# Patient Record
Sex: FEMALE | Race: WHITE | NOT HISPANIC OR LATINO | Employment: FULL TIME | URBAN - METROPOLITAN AREA
[De-identification: names, ages, dates, MRNs, and addresses within clinical notes are randomized per-mention and may not be internally consistent; named-entity substitution may affect disease eponyms.]

---

## 2023-12-29 ENCOUNTER — APPOINTMENT (OUTPATIENT)
Dept: CT IMAGING | Facility: HOSPITAL | Age: 23
End: 2023-12-29
Payer: COMMERCIAL

## 2023-12-29 ENCOUNTER — HOSPITAL ENCOUNTER (EMERGENCY)
Facility: HOSPITAL | Age: 23
Discharge: HOME | End: 2023-12-29
Attending: EMERGENCY MEDICINE
Payer: COMMERCIAL

## 2023-12-29 VITALS
RESPIRATION RATE: 16 BRPM | BODY MASS INDEX: 27.47 KG/M2 | SYSTOLIC BLOOD PRESSURE: 104 MMHG | HEART RATE: 73 BPM | HEIGHT: 67 IN | WEIGHT: 175 LBS | DIASTOLIC BLOOD PRESSURE: 73 MMHG | OXYGEN SATURATION: 100 % | TEMPERATURE: 97.9 F

## 2023-12-29 DIAGNOSIS — E86.0 DEHYDRATION: Primary | ICD-10-CM

## 2023-12-29 DIAGNOSIS — R10.9 ABDOMINAL PAIN, UNSPECIFIED ABDOMINAL LOCATION: ICD-10-CM

## 2023-12-29 DIAGNOSIS — R11.0 NAUSEA: ICD-10-CM

## 2023-12-29 LAB
ALBUMIN SERPL-MCNC: 4.4 G/DL (ref 3.5–5.2)
ALBUMIN/GLOB SERPL: 1.5 G/DL
ALP SERPL-CCNC: 136 U/L (ref 39–117)
ALT SERPL W P-5'-P-CCNC: 52 U/L (ref 1–33)
ANION GAP SERPL CALCULATED.3IONS-SCNC: 8 MMOL/L (ref 5–15)
AST SERPL-CCNC: 39 U/L (ref 1–32)
BASOPHILS # BLD AUTO: 0.02 10*3/MM3 (ref 0–0.2)
BASOPHILS NFR BLD AUTO: 0.3 % (ref 0–1.5)
BILIRUB SERPL-MCNC: 0.3 MG/DL (ref 0–1.2)
BUN SERPL-MCNC: 12 MG/DL (ref 6–20)
BUN/CREAT SERPL: 18.2 (ref 7–25)
CALCIUM SPEC-SCNC: 9.2 MG/DL (ref 8.6–10.5)
CHLORIDE SERPL-SCNC: 107 MMOL/L (ref 98–107)
CO2 SERPL-SCNC: 24 MMOL/L (ref 22–29)
CREAT SERPL-MCNC: 0.66 MG/DL (ref 0.57–1)
DEPRECATED RDW RBC AUTO: 41.9 FL (ref 37–54)
EGFRCR SERPLBLD CKD-EPI 2021: 125 ML/MIN/1.73
EOSINOPHIL # BLD AUTO: 0.15 10*3/MM3 (ref 0–0.4)
EOSINOPHIL NFR BLD AUTO: 2 % (ref 0.3–6.2)
ERYTHROCYTE [DISTWIDTH] IN BLOOD BY AUTOMATED COUNT: 13 % (ref 12.3–15.4)
FLUAV RNA RESP QL NAA+PROBE: NOT DETECTED
FLUBV RNA RESP QL NAA+PROBE: NOT DETECTED
GLOBULIN UR ELPH-MCNC: 2.9 GM/DL
GLUCOSE SERPL-MCNC: 105 MG/DL (ref 65–99)
HCG SERPL QL: NEGATIVE
HCT VFR BLD AUTO: 41.6 % (ref 34–46.6)
HGB BLD-MCNC: 13.4 G/DL (ref 12–15.9)
HOLD SPECIMEN: NORMAL
HOLD SPECIMEN: NORMAL
IMM GRANULOCYTES # BLD AUTO: 0 10*3/MM3 (ref 0–0.05)
IMM GRANULOCYTES NFR BLD AUTO: 0 % (ref 0–0.5)
LIPASE SERPL-CCNC: 38 U/L (ref 13–60)
LYMPHOCYTES # BLD AUTO: 2.13 10*3/MM3 (ref 0.7–3.1)
LYMPHOCYTES NFR BLD AUTO: 27.8 % (ref 19.6–45.3)
MAGNESIUM SERPL-MCNC: 2 MG/DL (ref 1.6–2.6)
MCH RBC QN AUTO: 27.8 PG (ref 26.6–33)
MCHC RBC AUTO-ENTMCNC: 32.2 G/DL (ref 31.5–35.7)
MCV RBC AUTO: 86.3 FL (ref 79–97)
MONOCYTES # BLD AUTO: 0.53 10*3/MM3 (ref 0.1–0.9)
MONOCYTES NFR BLD AUTO: 6.9 % (ref 5–12)
NEUTROPHILS NFR BLD AUTO: 4.84 10*3/MM3 (ref 1.7–7)
NEUTROPHILS NFR BLD AUTO: 63 % (ref 42.7–76)
PLATELET # BLD AUTO: 351 10*3/MM3 (ref 140–450)
PMV BLD AUTO: 10.1 FL (ref 6–12)
POTASSIUM SERPL-SCNC: 3.5 MMOL/L (ref 3.5–5.2)
PROT SERPL-MCNC: 7.3 G/DL (ref 6–8.5)
RBC # BLD AUTO: 4.82 10*6/MM3 (ref 3.77–5.28)
SARS-COV-2 RNA RESP QL NAA+PROBE: NOT DETECTED
SODIUM SERPL-SCNC: 139 MMOL/L (ref 136–145)
TSH SERPL DL<=0.05 MIU/L-ACNC: 3.08 UIU/ML (ref 0.27–4.2)
WBC NRBC COR # BLD AUTO: 7.67 10*3/MM3 (ref 3.4–10.8)
WHOLE BLOOD HOLD COAG: NORMAL
WHOLE BLOOD HOLD SPECIMEN: NORMAL

## 2023-12-29 PROCEDURE — 84703 CHORIONIC GONADOTROPIN ASSAY: CPT | Performed by: EMERGENCY MEDICINE

## 2023-12-29 PROCEDURE — 87636 SARSCOV2 & INF A&B AMP PRB: CPT | Performed by: EMERGENCY MEDICINE

## 2023-12-29 PROCEDURE — 99285 EMERGENCY DEPT VISIT HI MDM: CPT

## 2023-12-29 PROCEDURE — 96374 THER/PROPH/DIAG INJ IV PUSH: CPT

## 2023-12-29 PROCEDURE — 96376 TX/PRO/DX INJ SAME DRUG ADON: CPT

## 2023-12-29 PROCEDURE — 83690 ASSAY OF LIPASE: CPT | Performed by: EMERGENCY MEDICINE

## 2023-12-29 PROCEDURE — 25010000002 ONDANSETRON PER 1 MG: Performed by: EMERGENCY MEDICINE

## 2023-12-29 PROCEDURE — 83735 ASSAY OF MAGNESIUM: CPT | Performed by: EMERGENCY MEDICINE

## 2023-12-29 PROCEDURE — 25510000001 IOPAMIDOL PER 1 ML: Performed by: EMERGENCY MEDICINE

## 2023-12-29 PROCEDURE — 80050 GENERAL HEALTH PANEL: CPT | Performed by: EMERGENCY MEDICINE

## 2023-12-29 PROCEDURE — 93005 ELECTROCARDIOGRAM TRACING: CPT | Performed by: EMERGENCY MEDICINE

## 2023-12-29 PROCEDURE — 74177 CT ABD & PELVIS W/CONTRAST: CPT

## 2023-12-29 PROCEDURE — 25810000003 LACTATED RINGERS SOLUTION: Performed by: EMERGENCY MEDICINE

## 2023-12-29 RX ORDER — SODIUM CHLORIDE 0.9 % (FLUSH) 0.9 %
10 SYRINGE (ML) INJECTION AS NEEDED
Status: DISCONTINUED | OUTPATIENT
Start: 2023-12-29 | End: 2023-12-29 | Stop reason: HOSPADM

## 2023-12-29 RX ORDER — ONDANSETRON 2 MG/ML
4 INJECTION INTRAMUSCULAR; INTRAVENOUS ONCE
Status: COMPLETED | OUTPATIENT
Start: 2023-12-29 | End: 2023-12-29

## 2023-12-29 RX ORDER — ONDANSETRON 4 MG/1
4 TABLET, FILM COATED ORAL EVERY 8 HOURS PRN
Qty: 21 TABLET | Refills: 0 | Status: SHIPPED | OUTPATIENT
Start: 2023-12-29 | End: 2024-01-05

## 2023-12-29 RX ADMIN — ONDANSETRON 4 MG: 2 INJECTION INTRAMUSCULAR; INTRAVENOUS at 21:28

## 2023-12-29 RX ADMIN — IOPAMIDOL 100 ML: 755 INJECTION, SOLUTION INTRAVENOUS at 19:16

## 2023-12-29 RX ADMIN — SODIUM CHLORIDE, POTASSIUM CHLORIDE, SODIUM LACTATE AND CALCIUM CHLORIDE 1000 ML: 600; 310; 30; 20 INJECTION, SOLUTION INTRAVENOUS at 18:32

## 2023-12-29 RX ADMIN — ONDANSETRON 4 MG: 2 INJECTION INTRAMUSCULAR; INTRAVENOUS at 18:37

## 2023-12-29 NOTE — ED PROVIDER NOTES
Subjective   History of Present Illness  23-year-old female presents emergency room complaining of left lower quadrant pain and nausea anorexia starting today as well as palpitations that started today.  Patient states she also had a bowel movement which she described as normal except for the fact when she wiped she noticed blood on the toilet paper.  Patient reports no pain with bowel movement and states this is never happened before.  Last menstrual period was approximately 1 month ago and she is getting started having a period today.  Patient states she feels her heart racing as well as generalized malaise intermittently over the course the day.  Patient states she ate a piece of pizza this afternoon that is all she is eaten today but does not have much of an appetite.      Review of Systems   Constitutional:  Positive for appetite change. Negative for activity change, chills, diaphoresis, fatigue and fever.   HENT:  Negative for congestion, rhinorrhea and sore throat.    Eyes:  Negative for photophobia and visual disturbance.   Respiratory:  Negative for cough and shortness of breath.    Cardiovascular:  Positive for palpitations. Negative for chest pain and leg swelling.   Gastrointestinal:  Positive for abdominal pain and blood in stool. Negative for abdominal distention, diarrhea, nausea and vomiting.   Genitourinary:  Negative for decreased urine volume, dysuria, flank pain, frequency, pelvic pain, urgency, vaginal bleeding, vaginal discharge and vaginal pain.   Musculoskeletal:  Negative for arthralgias and back pain.   Skin:  Negative for rash.   Neurological:  Negative for dizziness, weakness and headaches.   Psychiatric/Behavioral:  Negative for agitation and behavioral problems.        History reviewed. No pertinent past medical history.    Allergies   Allergen Reactions    Penicillins Rash    Sulfa Antibiotics Rash       Past Surgical History:   Procedure Laterality Date    TONSILLECTOMY          History reviewed. No pertinent family history.    Social History     Socioeconomic History    Marital status: Single   Tobacco Use    Smoking status: Never    Smokeless tobacco: Never   Vaping Use    Vaping Use: Never used   Substance and Sexual Activity    Alcohol use: Never    Drug use: Defer    Sexual activity: Defer           Objective   Physical Exam  Vitals and nursing note reviewed.   Constitutional:       General: She is not in acute distress.     Appearance: Normal appearance. She is not ill-appearing, toxic-appearing or diaphoretic.   HENT:      Head: Normocephalic and atraumatic.      Nose: Nose normal.      Mouth/Throat:      Mouth: Mucous membranes are moist.   Eyes:      Conjunctiva/sclera: Conjunctivae normal.   Cardiovascular:      Rate and Rhythm: Normal rate.      Pulses: Normal pulses.   Pulmonary:      Effort: Pulmonary effort is normal.   Abdominal:      General: Abdomen is flat. There is no distension.      Tenderness: There is abdominal tenderness in the left lower quadrant. There is no right CVA tenderness, left CVA tenderness, guarding or rebound.      Hernia: No hernia is present.   Musculoskeletal:         General: No swelling. Normal range of motion.      Cervical back: Normal range of motion and neck supple.   Skin:     General: Skin is warm and dry.   Neurological:      General: No focal deficit present.      Mental Status: She is alert and oriented to person, place, and time.   Psychiatric:         Mood and Affect: Mood normal.         Procedures           ED Course  ED Course as of 12/29/23 2124   Fri Dec 29, 2023   1926 EKG time 1744  Sinus tachycardia  Heart rate 115  Axes are normal  Intervals are normal  No acute ST abnormalities noted []   2006 CT abdomen pelvis with contrast:  IMPRESSION:     No acute abnormality of the abdomen or pelvis. Normal appendix. Right ovarian corpus luteum.     Signer Name: Mk Kerr MD   Signed: 12/29/2023 7:46 PM EST  [facility]   []   2008 COVID19: Not Detected []   2008 Influenza A PCR: Not Detected []   2008 Influenza B PCR: Not Detected []   2009 CBC is normal []   2009 CMP is normal except for mild elevation of liver function test but normal bilirubin []   2009 TSH is normal []   2010 Pregnancy test is negative []   2050 Lipase: 38 []   2101 Reassessed patient and her heart rate is now normal.  She states that her nausea improved with the nausea medication but is now starting to come back slightly.  Patient still had no vomiting or diarrhea.  Discussed with her lab and radiologic findings specifically no acute abnormality noted other than mild increase in her LFTs within normal bilirubin.  Patient is to finish her IV fluids and then we will ambulate patient around room to assess for tachycardia and and if normal will discharge home with nausea medication with follow-up with primary care as needed and/or can return the emergency room for new persistent or worsening symptoms.  Patient states she understands agrees with plan. []   2122 After finishing IV fluids patient was ambulatory in the emergency room with no tachycardia.  Patient states she feels better and is asking for some Zofran before she leaves otherwise is comfortable with discharge home with follow-up primary care and/or can return the emergency room for new persistent or worsening symptoms. []      ED Course User Index  [] Kobe Oneill MD                                             Medical Decision Making  My differential diagnosis for abdominal pain includes but is not limited to:  Gastritis, gastroenteritis, peptic ulcer disease, GERD, esophageal perforation, acute appendicitis, mesenteric adenitis, Meckel's diverticulum, epiploic appendagitis, diverticulitis, colon cancer, ulcerative colitis, Crohn's disease, intussusception, small bowel obstruction, adhesions, ischemic bowel, perforated viscus, ileus,  obstipation, biliary colic, cholecystitis, cholelithiasis, Roberto-Wisam Paresh, hepatitis, pancreatitis, common bile duct obstruction, cholangitis, bile leak, splenic trauma, splenic rupture, splenic infarction, splenic abscess, abdominal wall hematoma, abdominal wall abscess, intra-abdominal abscess, ascites, spontaneous bacterial peritonitis, hernia, UTI, cystitis, prostatitis, ureterolithiasis, urinary obstruction, AAA, myocardial infarction, pneumonia, cancer, porphyria, DKA, medications, sickle cell, viral syndrome, zoster     Problems Addressed:  Abdominal pain, unspecified abdominal location: complicated acute illness or injury  Dehydration: complicated acute illness or injury  Nausea: complicated acute illness or injury    Amount and/or Complexity of Data Reviewed  Labs: ordered. Decision-making details documented in ED Course.  Radiology: ordered. Decision-making details documented in ED Course.  ECG/medicine tests: ordered. Decision-making details documented in ED Course.    Risk  Prescription drug management.        Final diagnoses:   Dehydration   Abdominal pain, unspecified abdominal location   Nausea       ED Disposition  ED Disposition       ED Disposition   Discharge    Condition   Stable    Comment   --               Provider, No Known  Psychiatric [id number]    Schedule an appointment as soon as possible for a visit       [facility] EMERGENCY DEPARTMENT  [address]  [place] [address] [phone]  Go to   As needed         Medication List        New Prescriptions      ondansetron 4 MG tablet  Commonly known as: ZOFRAN  Take 1 tablet by mouth Every 8 (Eight) Hours As Needed for Nausea or Vomiting for up to 7 days.               Where to Get Your Medications        These medications were sent to [facility] - [place] - [address] - [phone] [facility] [phone]   [address], [place] [address]      Phone:  [phone]   ondansetron 4 MG tablet            Kboe Oneill MD  12/29/23 2122

## 2024-01-01 LAB
QT INTERVAL: 333 MS
QTC INTERVAL: 462 MS

## 2024-07-25 ENCOUNTER — HOSPITAL ENCOUNTER (EMERGENCY)
Facility: HOSPITAL | Age: 24
Discharge: HOME | End: 2024-07-25
Attending: STUDENT IN AN ORGANIZED HEALTH CARE EDUCATION/TRAINING PROGRAM
Payer: COMMERCIAL

## 2024-07-25 ENCOUNTER — APPOINTMENT (OUTPATIENT)
Dept: ULTRASOUND IMAGING | Facility: HOSPITAL | Age: 24
End: 2024-07-25
Payer: COMMERCIAL

## 2024-07-25 VITALS
SYSTOLIC BLOOD PRESSURE: 114 MMHG | BODY MASS INDEX: 27.47 KG/M2 | DIASTOLIC BLOOD PRESSURE: 80 MMHG | OXYGEN SATURATION: 98 % | WEIGHT: 175.04 LBS | HEART RATE: 112 BPM | TEMPERATURE: 97.9 F | RESPIRATION RATE: 16 BRPM | HEIGHT: 67 IN

## 2024-07-25 DIAGNOSIS — O03.9 MISCARRIAGE: Primary | ICD-10-CM

## 2024-07-25 LAB
ALBUMIN SERPL-MCNC: 4.7 G/DL (ref 3.5–5.2)
ALBUMIN/GLOB SERPL: 1.3 G/DL
ALP SERPL-CCNC: 216 U/L (ref 39–117)
ALT SERPL W P-5'-P-CCNC: 40 U/L (ref 1–33)
ANION GAP SERPL CALCULATED.3IONS-SCNC: 10.1 MMOL/L (ref 5–15)
AST SERPL-CCNC: 29 U/L (ref 1–32)
BACTERIA UR QL AUTO: ABNORMAL /HPF
BASOPHILS # BLD AUTO: 0.03 10*3/MM3 (ref 0–0.2)
BASOPHILS NFR BLD AUTO: 0.4 % (ref 0–1.5)
BILIRUB SERPL-MCNC: 0.5 MG/DL (ref 0–1.2)
BILIRUB UR QL STRIP: NEGATIVE
BUN SERPL-MCNC: 8 MG/DL (ref 6–20)
BUN/CREAT SERPL: 12.3 (ref 7–25)
CALCIUM SPEC-SCNC: 9.5 MG/DL (ref 8.6–10.5)
CHLORIDE SERPL-SCNC: 106 MMOL/L (ref 98–107)
CLARITY UR: CLEAR
CO2 SERPL-SCNC: 21.9 MMOL/L (ref 22–29)
COLOR UR: YELLOW
CREAT SERPL-MCNC: 0.65 MG/DL (ref 0.57–1)
DEPRECATED RDW RBC AUTO: 43.5 FL (ref 37–54)
EGFRCR SERPLBLD CKD-EPI 2021: 124.7 ML/MIN/1.73
EOSINOPHIL # BLD AUTO: 0.1 10*3/MM3 (ref 0–0.4)
EOSINOPHIL NFR BLD AUTO: 1.4 % (ref 0.3–6.2)
ERYTHROCYTE [DISTWIDTH] IN BLOOD BY AUTOMATED COUNT: 13.8 % (ref 12.3–15.4)
GLOBULIN UR ELPH-MCNC: 3.7 GM/DL
GLUCOSE SERPL-MCNC: 115 MG/DL (ref 65–99)
GLUCOSE UR STRIP-MCNC: NEGATIVE MG/DL
HCG INTACT+B SERPL-ACNC: 218 MIU/ML
HCT VFR BLD AUTO: 40.6 % (ref 34–46.6)
HGB BLD-MCNC: 13.2 G/DL (ref 12–15.9)
HGB UR QL STRIP.AUTO: ABNORMAL
HYALINE CASTS UR QL AUTO: ABNORMAL /LPF
IMM GRANULOCYTES # BLD AUTO: 0.02 10*3/MM3 (ref 0–0.05)
IMM GRANULOCYTES NFR BLD AUTO: 0.3 % (ref 0–0.5)
KETONES UR QL STRIP: NEGATIVE
LEUKOCYTE ESTERASE UR QL STRIP.AUTO: NEGATIVE
LYMPHOCYTES # BLD AUTO: 2.01 10*3/MM3 (ref 0.7–3.1)
LYMPHOCYTES NFR BLD AUTO: 29.1 % (ref 19.6–45.3)
MCH RBC QN AUTO: 27.8 PG (ref 26.6–33)
MCHC RBC AUTO-ENTMCNC: 32.5 G/DL (ref 31.5–35.7)
MCV RBC AUTO: 85.7 FL (ref 79–97)
MONOCYTES # BLD AUTO: 0.38 10*3/MM3 (ref 0.1–0.9)
MONOCYTES NFR BLD AUTO: 5.5 % (ref 5–12)
NEUTROPHILS NFR BLD AUTO: 4.37 10*3/MM3 (ref 1.7–7)
NEUTROPHILS NFR BLD AUTO: 63.3 % (ref 42.7–76)
NITRITE UR QL STRIP: NEGATIVE
NRBC BLD AUTO-RTO: 0 /100 WBC (ref 0–0.2)
PH UR STRIP.AUTO: 6.5 [PH] (ref 4.5–8)
PLATELET # BLD AUTO: 448 10*3/MM3 (ref 140–450)
PMV BLD AUTO: 9.5 FL (ref 6–12)
POTASSIUM SERPL-SCNC: 3.5 MMOL/L (ref 3.5–5.2)
PROT SERPL-MCNC: 8.4 G/DL (ref 6–8.5)
PROT UR QL STRIP: NEGATIVE
RBC # BLD AUTO: 4.74 10*6/MM3 (ref 3.77–5.28)
RBC # UR STRIP: ABNORMAL /HPF
REF LAB TEST METHOD: ABNORMAL
SODIUM SERPL-SCNC: 138 MMOL/L (ref 136–145)
SP GR UR STRIP: 1.01 (ref 1–1.03)
SQUAMOUS #/AREA URNS HPF: ABNORMAL /HPF
UROBILINOGEN UR QL STRIP: ABNORMAL
WBC # UR STRIP: ABNORMAL /HPF
WBC NRBC COR # BLD AUTO: 6.91 10*3/MM3 (ref 3.4–10.8)

## 2024-07-25 PROCEDURE — 99284 EMERGENCY DEPT VISIT MOD MDM: CPT

## 2024-07-25 PROCEDURE — 80053 COMPREHEN METABOLIC PANEL: CPT | Performed by: STUDENT IN AN ORGANIZED HEALTH CARE EDUCATION/TRAINING PROGRAM

## 2024-07-25 PROCEDURE — 85025 COMPLETE CBC W/AUTO DIFF WBC: CPT | Performed by: STUDENT IN AN ORGANIZED HEALTH CARE EDUCATION/TRAINING PROGRAM

## 2024-07-25 PROCEDURE — 84702 CHORIONIC GONADOTROPIN TEST: CPT | Performed by: STUDENT IN AN ORGANIZED HEALTH CARE EDUCATION/TRAINING PROGRAM

## 2024-07-25 PROCEDURE — 76801 OB US < 14 WKS SINGLE FETUS: CPT

## 2024-07-25 PROCEDURE — 81001 URINALYSIS AUTO W/SCOPE: CPT | Performed by: STUDENT IN AN ORGANIZED HEALTH CARE EDUCATION/TRAINING PROGRAM

## 2024-07-25 NOTE — ED PROVIDER NOTES
Subjective   History of Present Illness  Pt is a 24 y.o. female with PMH as listed who presents for   Chief Complaint   Patient presents with    Vaginal Bleeding - Pregnant     8 weeks pregnant. Bleeding started yesterday.       Patient is a 24-year-old female  A0 at 8 weeks by LMP who presents for vaginal spotting and abdominal pain/cramping for the past 24 hours.  Called her OB today who recommended she present to the ED for further evaluation and management.  Denies any chest pain shortness breath nausea vomiting.  Has only had spotting, is not soaking through a pad.  She is A positive, type and screen not indicated at this time.  She has no other new complaints currently.    Review of Systems    History reviewed. No pertinent past medical history.    Allergies   Allergen Reactions    Penicillins Rash    Sulfa Antibiotics Rash       Past Surgical History:   Procedure Laterality Date    TONSILLECTOMY         History reviewed. No pertinent family history.    Social History     Socioeconomic History    Marital status: Single   Tobacco Use    Smoking status: Never    Smokeless tobacco: Never   Vaping Use    Vaping status: Never Used   Substance and Sexual Activity    Alcohol use: Never    Drug use: Defer    Sexual activity: Defer           Objective   Physical Exam  Constitutional:       Appearance: Normal appearance.   HENT:      Head: Normocephalic and atraumatic.      Mouth/Throat:      Mouth: Mucous membranes are moist.      Pharynx: Oropharynx is clear.   Eyes:      Conjunctiva/sclera: Conjunctivae normal.   Cardiovascular:      Rate and Rhythm: Normal rate.   Pulmonary:      Effort: Pulmonary effort is normal.   Abdominal:      General: Abdomen is flat.      Palpations: Abdomen is soft.      Tenderness: There is no abdominal tenderness.   Musculoskeletal:      Cervical back: Neck supple.   Skin:     General: Skin is warm and dry.   Neurological:      Mental Status: She is alert.   Psychiatric:         Mood  and Affect: Mood normal.         Procedures           ED Course  ED Course as of 07/25/24 1120   Thu Jul 25, 2024   0955 Patient is a 24-year-old female presents for vaginal bleeding during pregnancy.  She is 8 weeks by LMP, has not had appointment with her OB per chart review.  Patient is a positive, type and screen repeat not indicated today.  Will obtain CBC CMP hCG quant and UA. And US. [JF]   1120 All lab work unremarkable for any acute findings.  hCG quant is low for gestational age.  Ultrasound ultimately showed no gestational sac, normal cervix no retained products of conception noted on OB ultrasound.  Patient to follow-up with OB in the next 1 to 2 days for hCG recheck and further outpatient management.  Patient understands and agrees with plan of care.  All questions answered. [JF]      ED Course User Index  [JF] Lai Barron MD                                             Medical Decision Making  My differential diagnosis for vaginal bleeding includes but is not limited to normal menstruation, menorrhagia, menorrhagia, metromenorrhagia, threatened miscarriage, incomplete miscarriage, imminent miscarriage, dysfunctional uterine bleeding, bleeding disorders, vaginal trauma, STDs and PID.      Problems Addressed:  Miscarriage: complicated acute illness or injury    Amount and/or Complexity of Data Reviewed  Labs: ordered. Decision-making details documented in ED Course.  Radiology: ordered. Decision-making details documented in ED Course.        Final diagnoses:   Miscarriage       ED Disposition  ED Disposition       ED Disposition   Discharge    Condition   Stable    Comment   --               PATIENT CONNECTION - [facility] [address]  [phone]  Schedule an appointment as soon as possible for a visit in 2 days  Follow-up with your PCP or call to schedule appointment to establish care., For re-evaluation    [facility]  [address] [facility]  [address]  [phone]  Call today  For re-evaluation         Medication List      No changes were made to your prescriptions during this visit.            Lai Barron MD  07/25/24 1171

## 2024-07-26 ENCOUNTER — OFFICE VISIT (OUTPATIENT)
Dept: OBSTETRICS AND GYNECOLOGY | Facility: CLINIC | Age: 24
End: 2024-07-26
Payer: COMMERCIAL

## 2024-07-26 VITALS
HEIGHT: 67 IN | WEIGHT: 166 LBS | BODY MASS INDEX: 26.06 KG/M2 | DIASTOLIC BLOOD PRESSURE: 74 MMHG | SYSTOLIC BLOOD PRESSURE: 118 MMHG

## 2024-07-26 DIAGNOSIS — Z13.89 SCREENING FOR GENITOURINARY CONDITION: Primary | ICD-10-CM

## 2024-07-26 DIAGNOSIS — Z31.69 ENCOUNTER FOR PRECONCEPTION CONSULTATION: ICD-10-CM

## 2024-07-26 DIAGNOSIS — O20.0 THREATENED ABORTION: ICD-10-CM

## 2024-07-26 DIAGNOSIS — Z76.89 ENCOUNTER TO ESTABLISH CARE WITH NEW DOCTOR: ICD-10-CM

## 2024-07-26 LAB
B-HCG UR QL: POSITIVE
BILIRUB BLD-MCNC: NEGATIVE MG/DL
CLARITY, POC: CLEAR
COLOR UR: YELLOW
EXPIRATION DATE: ABNORMAL
GLUCOSE UR STRIP-MCNC: NEGATIVE MG/DL
HCG INTACT+B SERPL-ACNC: 117 MIU/ML
INTERNAL NEGATIVE CONTROL: ABNORMAL
INTERNAL POSITIVE CONTROL: ABNORMAL
KETONES UR QL: NEGATIVE
LEUKOCYTE EST, POC: NEGATIVE
Lab: ABNORMAL
NITRITE UR-MCNC: NEGATIVE MG/ML
PH UR: 6 [PH] (ref 5–8)
PROT UR STRIP-MCNC: NEGATIVE MG/DL
RBC # UR STRIP: ABNORMAL /UL
SP GR UR: 1.01 (ref 1–1.03)
UROBILINOGEN UR QL: NORMAL

## 2024-07-26 RX ORDER — CEFDINIR 300 MG/1
300 CAPSULE ORAL
COMMUNITY
Start: 2024-07-19 | End: 2024-07-29

## 2024-07-26 NOTE — PROGRESS NOTES
New GYN Exam    CC- Here for threatened AB    Andrea OQUENDO is a 24 y.o. female new patient who presents for threatened AB. She started having VB and knew she was pregnant and so went to the ER yesterday. Her Quant was 218. Her US did not show an IUP or adnexal masses. She is still bleeding. She is Rh +. She is not having pain. Her US today shows a 9.2 cm uterus with an EL= 1.1 cm. There is no IUP or gestational sac. Her ovaries are normal. There is no free fluid. Her US is compared to her last scan on 2024. We discussed that we need to follow her quant to zero or to US discrimitory zone.        OB History          2    Para   2    Term   2            AB   0    Living   2         SAB        IAB        Ectopic        Molar        Multiple        Live Births   2          Obstetric Comments   2 , cholestasis in first pregnancy.                Menarche: 13  Current contraception: none  History of abnormal Pap smear: no  History of abnormal mammogram: no  Family history of uterine, colon or ovarian cancer: no  Family history of breast cancer: no  H/o STDs: none  Last pap:2 years  Gardasil:completed  CONOR:  MGF DVT    Health Maintenance   Topic Date Due    Annual Gynecologic Pelvic and Breast Exam  Never done    BMI FOLLOWUP  Never done    HPV VACCINES (1 - 3-dose series) Never done    COVID-19 Vaccine (2023- season) Never done    ANNUAL PHYSICAL  Never done    PAP SMEAR  Never done    INFLUENZA VACCINE  2024    TDAP/TD VACCINES (3 - Td or Tdap) 2032    HEPATITIS C SCREENING  Completed    Pneumococcal Vaccine 0-64  Aged Out       Past Medical History:   Diagnosis Date    Anxiety        Past Surgical History:   Procedure Laterality Date    TONSILLECTOMY           Current Outpatient Medications:     cefdinir (OMNICEF) 300 MG capsule, Take 1 capsule by mouth. (Patient not taking: Reported on 2024), Disp: , Rfl:     ergocalciferol (ERGOCALCIFEROL) 1.25 MG (94911 UT) capsule, Take 1  "capsule by mouth. (Patient not taking: Reported on 7/26/2024), Disp: , Rfl:     norethindrone-ethinyl estradiol-iron (Junel FE 1.5/30) 1.5-30 MG-MCG tablet, Take 1 tablet by mouth Daily. (Patient not taking: Reported on 7/26/2024), Disp: , Rfl:     Allergies   Allergen Reactions    Penicillins Rash    Sulfa Antibiotics Rash       Social History     Tobacco Use    Smoking status: Never    Smokeless tobacco: Never   Vaping Use    Vaping status: Never Used   Substance Use Topics    Alcohol use: Never    Drug use: Defer       Family History   Problem Relation Age of Onset    Deep vein thrombosis Maternal Grandfather     Breast cancer Neg Hx     Ovarian cancer Neg Hx     Uterine cancer Neg Hx     Colon cancer Neg Hx        Review of Systems   Constitutional:  Positive for activity change. Negative for appetite change, fatigue, fever and unexpected weight change.   Eyes:  Negative for photophobia and visual disturbance.   Respiratory:  Negative for cough and shortness of breath.    Cardiovascular:  Negative for chest pain and palpitations.   Gastrointestinal:  Negative for abdominal distention, abdominal pain, constipation, diarrhea and nausea.   Endocrine: Negative for cold intolerance and heat intolerance.   Genitourinary:  Positive for vaginal bleeding. Negative for dyspareunia, dysuria, menstrual problem, pelvic pain and vaginal discharge.   Musculoskeletal:  Negative for back pain.   Skin:  Negative for color change and rash.   Neurological:  Negative for headaches.   Hematological:  Negative for adenopathy. Does not bruise/bleed easily.   Psychiatric/Behavioral:  Negative for dysphoric mood. The patient is not nervous/anxious.        /74   Ht 170.2 cm (67\")   Wt 75.3 kg (166 lb)   LMP 06/02/2024   BMI 26.00 kg/m²     Physical Exam  Vitals and nursing note reviewed.   Constitutional:       Appearance: Normal appearance. She is well-developed.   HENT:      Head: Normocephalic and atraumatic.   Eyes:      " General: No scleral icterus.     Conjunctiva/sclera: Conjunctivae normal.   Neck:      Thyroid: No thyromegaly.   Abdominal:      General: There is no distension.      Palpations: Abdomen is soft. There is no mass.      Tenderness: There is no abdominal tenderness. There is no guarding or rebound.      Hernia: No hernia is present.   Skin:     General: Skin is warm and dry.   Neurological:      Mental Status: She is alert and oriented to person, place, and time.   Psychiatric:         Behavior: Behavior normal.         Thought Content: Thought content normal.         Judgment: Judgment normal.               Assessment/Plan  1) Threatened AB- Rh +. Check quant BHCG and follow to zero or to US disc zone.   2) GYN HM: UTD 2 years per pt.    SBE demonstrated and encouraged.  3) STD screening: declines Condoms encouraged.  4) Contraception: none  5) We discussed a healthy lifestyle before pregnancy, including avoidance of tobacco, alcohol and drugs.  We reviewed her prescription medications.  She does not have any hereditary disease or birth defects in her or her partner’s family.  I recommend she get a flu shot yearly and take folic acid daily.  She was offered testing for a type and screen and a varicella and a rubella IgG and she did accept.   She was also offered pre conceptual screening for SMA, FX and CF and did not accept. We also discussed expanded carrier screening and she did not accept.   6) Diet and Exercise discussed  7) Smoking Status: No  8) Social: .   9) MMG- plan age 38  10)ROR previous OBGYN  11) Schedule annual when due  12) EPIC LOS calculator used to determine coding level.          Diagnoses and all orders for this visit:    1. Screening for genitourinary condition (Primary)  -     POC Urinalysis Dipstick  -     POC Pregnancy, Urine    2. Threatened   -     HCG, B-subunit, Quantitative  -     Varicella Zoster Antibody, IgG  -     Rubella Antibody, IgG    3. Encounter for  preconception consultation    4. Encounter to establish care with new doctor          Gricelda Vergara MD  07/26/2024  16:46 EDT

## 2024-07-27 LAB
RUBV IGG SERPL IA-ACNC: <0.9 INDEX
VZV IGG SER IA-ACNC: <135 INDEX

## 2024-07-29 NOTE — PROGRESS NOTES
PIP= quant has dropped to 117, she need to RTO in 1 week for recheck and follow to zero.  She is no longer immune to varicella or rubella.  She needs to get an MMR vaccine x 1 and the varicella vaccine x 2 (1 month apart) and then wait at least 1 month after her last injection to attempt pregnancy again.

## 2024-08-01 ENCOUNTER — TELEPHONE (OUTPATIENT)
Dept: OBSTETRICS AND GYNECOLOGY | Facility: CLINIC | Age: 24
End: 2024-08-01
Payer: COMMERCIAL

## 2024-08-01 NOTE — TELEPHONE ENCOUNTER
I just wanted to reach out to Dr. Vergara and let her know that I contacted my pediatrician to see if I already had the MMR and Varicella vaccines when I was younger because I was unsure and I did get them. Is it safe to go ahead and get the vaccines again?

## 2024-08-14 ENCOUNTER — TELEPHONE (OUTPATIENT)
Dept: OBSTETRICS AND GYNECOLOGY | Facility: CLINIC | Age: 24
End: 2024-08-14
Payer: COMMERCIAL

## 2024-08-14 NOTE — TELEPHONE ENCOUNTER
I called and s/w pt and told her her quant was down to zero.  Her quant does not need to be followed.  We also discussed that she is rubella and varicella nonimmune.  She needs to get both of those vaccines and then wait at least 1 month from her last shot to try to attempt pregnancy.  All questions answered.    Gricelda Vergara MD

## 2025-01-20 ENCOUNTER — OFFICE VISIT (OUTPATIENT)
Dept: OBSTETRICS AND GYNECOLOGY | Facility: CLINIC | Age: 25
End: 2025-01-20
Payer: COMMERCIAL

## 2025-01-20 VITALS
WEIGHT: 169.2 LBS | DIASTOLIC BLOOD PRESSURE: 86 MMHG | BODY MASS INDEX: 26.56 KG/M2 | HEIGHT: 67 IN | SYSTOLIC BLOOD PRESSURE: 122 MMHG

## 2025-01-20 DIAGNOSIS — E28.2 PCOS (POLYCYSTIC OVARIAN SYNDROME): ICD-10-CM

## 2025-01-20 DIAGNOSIS — Z13.89 SCREENING FOR GENITOURINARY CONDITION: ICD-10-CM

## 2025-01-20 DIAGNOSIS — Z28.39 MATERNAL VARICELLA, NON-IMMUNE: ICD-10-CM

## 2025-01-20 DIAGNOSIS — N93.9 ABNORMAL UTERINE BLEEDING (AUB): ICD-10-CM

## 2025-01-20 DIAGNOSIS — O09.899 MATERNAL VARICELLA, NON-IMMUNE: ICD-10-CM

## 2025-01-20 DIAGNOSIS — F41.9 ANXIETY: ICD-10-CM

## 2025-01-20 DIAGNOSIS — O09.899 RUBELLA NON-IMMUNE STATUS, ANTEPARTUM: ICD-10-CM

## 2025-01-20 DIAGNOSIS — L65.9 HAIR LOSS: Primary | ICD-10-CM

## 2025-01-20 DIAGNOSIS — Z28.39 RUBELLA NON-IMMUNE STATUS, ANTEPARTUM: ICD-10-CM

## 2025-01-20 DIAGNOSIS — R53.83 OTHER FATIGUE: ICD-10-CM

## 2025-01-20 LAB
B-HCG UR QL: NEGATIVE
BILIRUB BLD-MCNC: NEGATIVE MG/DL
CLARITY, POC: CLEAR
COLOR UR: YELLOW
EXPIRATION DATE: NORMAL
GLUCOSE UR STRIP-MCNC: NEGATIVE MG/DL
INTERNAL NEGATIVE CONTROL: NEGATIVE
INTERNAL POSITIVE CONTROL: POSITIVE
KETONES UR QL: NEGATIVE
LEUKOCYTE EST, POC: NEGATIVE
Lab: 55
NITRITE UR-MCNC: NEGATIVE MG/ML
PH UR: 5 [PH] (ref 5–8)
PROT UR STRIP-MCNC: NEGATIVE MG/DL
RBC # UR STRIP: NEGATIVE /UL
SP GR UR: 1 (ref 1–1.03)
UROBILINOGEN UR QL: NORMAL

## 2025-01-20 RX ORDER — SERTRALINE HYDROCHLORIDE 25 MG/1
25 TABLET, FILM COATED ORAL DAILY
Qty: 30 TABLET | Refills: 2 | Status: SHIPPED | OUTPATIENT
Start: 2025-01-20

## 2025-01-20 NOTE — PROGRESS NOTES
Andrea OQUENDO is a 24 y.o. patient who presents for follow up of   Chief Complaint   Patient presents with    Follow-up     Hormones     24-year-old established patient here for several issues.  She was seen in July of this year for a spontaneous AB.  They have been trying to get pregnant for 3 months.  Her cycles have been between every 27 to 37 days and are a bit heavier than they were before her miscarriage.  Since October she has been feeling irritable with hair loss and fatigue.  She did see her primary care doctor and had some labs drawn which showed low vitamin D.  Her ultrasound today shows a 7.6 cm uterus with an endometrial lining of 0.71 cm.  Her ovaries do appear polycystic.  Her ultrasound was compared to her last scan on 1/23/2024.  We discussed that she has mild PCOS based on her irregular cycles and polycystic ovaries on ultrasound.  We will do a PCOS panel today.  We will also check her thyroid panel.  They are interested in pregnancy..  She is also no longer immune to rubella or varicella.  I have encouraged her to get those vaccines and wait at least 1 month after her final shot to attempt pregnancy in order to avoid congenital rubella and varicella complications.  She voices understanding.  We also discussed that if she is not pregnant after 6 months of trying, she should then come back in for day 21 progesterone and discussion of ovulation induction agents such as Clomid and/or letrozole.  She was previously on Lexapro and it caused a racing heart.  She is concerned about her anxiety and irritability.  We discussed starting a low-dose of Zoloft and she is agreeable.        The following portions of the patient's history were reviewed and updated as appropriate: allergies, current medications and problem list.    Review of Systems   Constitutional:  Positive for activity change and fatigue.   Genitourinary:  Positive for menstrual problem and pelvic pain.   Skin:         + hair loss  "  Psychiatric/Behavioral:  Positive for dysphoric mood and sleep disturbance. The patient is nervous/anxious.        /86   Ht 170.2 cm (67.01\")   Wt 76.7 kg (169 lb 3.2 oz)   LMP 12/30/2024   BMI 26.49 kg/m²     Physical Exam  Vitals and nursing note reviewed.   Constitutional:       Appearance: Normal appearance. She is well-developed.   HENT:      Head: Normocephalic and atraumatic.   Eyes:      General: No scleral icterus.     Conjunctiva/sclera: Conjunctivae normal.   Neck:      Thyroid: No thyromegaly.   Abdominal:      General: There is no distension.      Palpations: Abdomen is soft. There is no mass.      Tenderness: There is no abdominal tenderness. There is no guarding or rebound.      Hernia: No hernia is present.   Skin:     General: Skin is warm and dry.   Neurological:      Mental Status: She is alert and oriented to person, place, and time.   Psychiatric:         Mood and Affect: Mood normal.         Behavior: Behavior normal.         Thought Content: Thought content normal.         Judgment: Judgment normal.         A/P:  1. Hair loss and fatigue- check thyroid panel/TPO  2. Discussed with patient the symptoms of PCOS which include irregular periods, difficulty controlling or maintaining weight (80% of women with PCOS are overweight), acne, unwanted hair growth, patches of velvety darkened skin called acanthuses nigrans, and multiple small cysts or follicles on the ovaries.  This diagnosis is made through taking a patient’s history and obtaining fasting lab work and a pelvic ultrasound.  If a patient meets two out of three criteria for PCOS, a diagnosis is made.     PCOS is common, with 1 out of 10 women meeting criteria for the disorder.  The exact cause of PCOS is unknown and is probably the result of many factors working together.  One of these factors is elevated insulin resistance.  IR is a condition where the body’s cells do not respond to insulin as they should and it takes MORE " insulin to move the sugar into the cells of the body.  So, patients with PCOS have elevated levels of fasting insulin, independent of what they eat.  Over time, IR can lead to diabetes.  One of the treatments for PCOS is metformin, which helps with insulin regulation.  Weight loss, even 10-15 pounds, can also help with insulin regulation and help normalize periods.  Depending on a patient’s desire for pregnancy, we also use birth control pills to help regulate cycles, decrease levels of male hormones called androgens that can lead to hair growth and acne, as well as decrease risks of uterine cancer.  For those patients wanting pregnancy now, we recommend weight loss and medications to help stimulate the ovaries to ovulate.  Part of the benefit of diagnosing a patient at a young age with PCOS is that we can help her have good control of her insulin levels, bleeding and weight so that ovulation is more likely even without medication.  Check PCOS panel and HGBA1c  3. RNI- needs MMR and wait at least one month after last injection to conceive  4. VNI- needs Varivax and wait at least one month after last injection to conceive  5. Anxiety- ERX Zoloft 25 mg QD  6. RHM- RTO in 3-4 months f/u cycles and Zoloft.   7.EPIC LOS calculator used to determine coding level.       Assessment & Plan   Diagnoses and all orders for this visit:    1. Hair loss (Primary)  -     17-Hydroxyprogesterone  -     DHEA-Sulfate  -     Estradiol  -     Follicle Stimulating Hormone  -     Glucose, Plasma ([facility])  -     Insulin, Total  -     Prolactin  -     Testosterone Free Direct  -     TSH Rfx On Abnormal To Free T4  -     T3  -     T4, Free  -     TSH  -     Thyroid Peroxidase Antibody    2. Screening for genitourinary condition  -     POC Urinalysis Dipstick  -     POC Pregnancy, Urine    3. Abnormal uterine bleeding (AUB)  -     17-Hydroxyprogesterone  -     DHEA-Sulfate  -     Estradiol  -     Follicle Stimulating Hormone  -     Glucose,  Plasma ([facility])  -     Insulin, Total  -     Prolactin  -     Testosterone Free Direct  -     TSH Rfx On Abnormal To Free T4  -     T3  -     T4, Free  -     TSH  -     Thyroid Peroxidase Antibody  -     Hemoglobin A1c    4. Other fatigue    5. Rubella non-immune status, antepartum    6. Maternal varicella, non-immune    7. PCOS (polycystic ovarian syndrome)    8. Anxiety    Other orders  -     sertraline (Zoloft) 25 MG tablet; Take 1 tablet by mouth Daily.  Dispense: 30 tablet; Refill: 2                 No follow-ups on file.      Gricelda Vergara MD    1/20/2025  10:52 EST

## 2025-01-24 DIAGNOSIS — R76.8 ANTI-TPO ANTIBODIES PRESENT: Primary | ICD-10-CM

## 2025-01-24 LAB
17OHP SERPL-MCNC: 32 NG/DL
DHEA-S SERPL-MCNC: 131 UG/DL (ref 84.8–378)
ESTRADIOL SERPL-MCNC: 92.2 PG/ML
FSH SERPL-ACNC: 5.5 MIU/ML
GLUCOSE P FAST SERPL-MCNC: 82 MG/DL (ref 70–99)
HBA1C MFR BLD: 5.3 % (ref 4.8–5.6)
INSULIN SERPL-ACNC: 8.4 UIU/ML (ref 2.6–24.9)
PROLACTIN SERPL-MCNC: 11.4 NG/ML (ref 4.8–33.4)
T3 SERPL-MCNC: 139 NG/DL (ref 71–180)
T4 FREE SERPL-MCNC: 0.99 NG/DL (ref 0.82–1.77)
TESTOST FREE SERPL-MCNC: 0.9 PG/ML (ref 0–4.2)
THYROPEROXIDASE AB SERPL-ACNC: 68 IU/ML (ref 0–34)
TSH SERPL DL<=0.005 MIU/L-ACNC: 3.66 UIU/ML (ref 0.45–4.5)
TSH SERPL DL<=0.005 MIU/L-ACNC: NORMAL UIU/ML

## 2025-01-24 NOTE — PROGRESS NOTES
PIP= PCOS labs are normal. Her thyroid function tests were normal but she does have antithyroid antibodies. Schedule thyroid US and have her f/u with her primary care MD for further thyroid management.

## 2025-01-31 ENCOUNTER — HOSPITAL ENCOUNTER (OUTPATIENT)
Dept: ULTRASOUND IMAGING | Facility: HOSPITAL | Age: 25
Discharge: HOME | End: 2025-01-31
Admitting: OBSTETRICS & GYNECOLOGY
Payer: COMMERCIAL

## 2025-01-31 DIAGNOSIS — R76.8 ANTI-TPO ANTIBODIES PRESENT: ICD-10-CM

## 2025-01-31 PROCEDURE — 76536 US EXAM OF HEAD AND NECK: CPT

## 2025-03-19 ENCOUNTER — OFFICE VISIT (OUTPATIENT)
Dept: INTERNAL MEDICINE | Facility: CLINIC | Age: 25
End: 2025-03-19
Payer: COMMERCIAL

## 2025-03-19 VITALS
RESPIRATION RATE: 16 BRPM | HEIGHT: 67 IN | BODY MASS INDEX: 26.87 KG/M2 | OXYGEN SATURATION: 97 % | DIASTOLIC BLOOD PRESSURE: 68 MMHG | WEIGHT: 171.2 LBS | TEMPERATURE: 98 F | SYSTOLIC BLOOD PRESSURE: 96 MMHG | HEART RATE: 101 BPM

## 2025-03-19 DIAGNOSIS — M25.541 ARTHRALGIA OF BOTH HANDS: Primary | ICD-10-CM

## 2025-03-19 DIAGNOSIS — M25.542 ARTHRALGIA OF BOTH HANDS: Primary | ICD-10-CM

## 2025-03-19 PROCEDURE — 99213 OFFICE O/P EST LOW 20 MIN: CPT | Performed by: INTERNAL MEDICINE

## 2025-03-19 NOTE — PROGRESS NOTES
"Chief Complaint  Establish Care (Concerned with elevated liver enzymes and thyroid concerns//Patient is fasting)    Jairo OQUENDO presents to [facility]  History of Present Illness    Ms. Oquendo is a sloane 26 yo F who presents to establish care and discuss concerns about autoimmunity.     Had miscarriage and then had return of her period.  The next month she noted a lot of fatigue.  She had +TPO antibodies, but TSH of 3.66.  started having increasing joint pain shoulder initially.  Having migratory pain in fingers and MCP joints.    Objective   Vital Signs:  BP 96/68 (BP Location: Left arm, Patient Position: Sitting, Cuff Size: Large Adult)   Pulse 101   Temp 98 °F (36.7 °C) (Infrared)   Resp 16   Ht 170.2 cm (67\")   Wt 77.7 kg (171 lb 3.2 oz)   SpO2 97%   BMI 26.81 kg/m²   Estimated body mass index is 26.81 kg/m² as calculated from the following:    Height as of this encounter: 170.2 cm (67\").    Weight as of this encounter: 77.7 kg (171 lb 3.2 oz).        Physical Exam  Vitals reviewed.   Constitutional:       General: She is not in acute distress.     Appearance: Normal appearance.   HENT:      Head: Normocephalic and atraumatic.      Nose: Nose normal.      Mouth/Throat:      Mouth: Mucous membranes are moist.   Eyes:      Conjunctiva/sclera: Conjunctivae normal.   Cardiovascular:      Rate and Rhythm: Normal rate and regular rhythm.      Pulses: Normal pulses.      Heart sounds: Normal heart sounds.   Pulmonary:      Effort: Pulmonary effort is normal.      Breath sounds: Normal breath sounds.   Abdominal:      Palpations: Abdomen is soft.      Tenderness: There is no abdominal tenderness.   Musculoskeletal:      Right lower leg: No edema.      Left lower leg: No edema.   Skin:     General: Skin is warm and dry.   Neurological:      General: No focal deficit present.      Mental Status: She is alert.   Psychiatric:         Mood and Affect: Mood normal.      "   Result Review :  Reviewed data available in the chart         Assessment and Plan   Diagnoses and all orders for this visit:    1. Arthralgia of both hands (Primary)  -     Sedimentation Rate  -     C-reactive Protein  -     XR hand 2 vw bilateral  -     Lupus Anticoagulant  -     BETSEY Direct Reflex to 11 Biomarker      Work up autoimmune disease and return in 4-5 weeks to further discuss mental health, results, etc.          Follow Up   4-5 weeks  Patient was given instructions and counseling regarding her condition or for health maintenance advice. Please see specific information pulled into the AVS if appropriate.

## 2025-04-28 ENCOUNTER — OFFICE VISIT (OUTPATIENT)
Dept: INTERNAL MEDICINE | Facility: CLINIC | Age: 25
End: 2025-04-28
Payer: COMMERCIAL

## 2025-04-28 VITALS
BODY MASS INDEX: 27.03 KG/M2 | HEIGHT: 67 IN | WEIGHT: 172.2 LBS | DIASTOLIC BLOOD PRESSURE: 78 MMHG | RESPIRATION RATE: 16 BRPM | TEMPERATURE: 98 F | HEART RATE: 104 BPM | OXYGEN SATURATION: 100 % | SYSTOLIC BLOOD PRESSURE: 120 MMHG

## 2025-04-28 DIAGNOSIS — M25.552 PAIN OF BOTH HIP JOINTS: Primary | ICD-10-CM

## 2025-04-28 DIAGNOSIS — M25.551 PAIN OF BOTH HIP JOINTS: Primary | ICD-10-CM

## 2025-04-28 DIAGNOSIS — R94.6 ABNORMAL THYROID FUNCTION TEST: ICD-10-CM

## 2025-04-28 PROCEDURE — 99214 OFFICE O/P EST MOD 30 MIN: CPT | Performed by: INTERNAL MEDICINE

## 2025-04-28 NOTE — PROGRESS NOTES
"Chief Complaint  Hand Pain (Bilateral hand/joint pain )    Jairo OQUENDO presents to [facility]  Hand Pain         Continued joint pain, but now newly pregnant.     Objective   Vital Signs:  /78 (BP Location: Left arm, Patient Position: Sitting, Cuff Size: Adult)   Pulse 104   Temp 98 °F (36.7 °C) (Infrared)   Resp 16   Ht 170.2 cm (67\")   Wt 78.1 kg (172 lb 3.2 oz)   SpO2 100%   BMI 26.97 kg/m²   Estimated body mass index is 26.97 kg/m² as calculated from the following:    Height as of this encounter: 170.2 cm (67\").    Weight as of this encounter: 78.1 kg (172 lb 3.2 oz).        Physical Exam  Vitals reviewed.   Constitutional:       General: She is not in acute distress.     Appearance: Normal appearance.   HENT:      Head: Normocephalic and atraumatic.      Nose: Nose normal.      Mouth/Throat:      Mouth: Mucous membranes are moist.   Eyes:      Conjunctiva/sclera: Conjunctivae normal.   Pulmonary:      Effort: Pulmonary effort is normal.   Musculoskeletal:         General: No swelling, tenderness, deformity or signs of injury.   Skin:     General: Skin is warm and dry.   Neurological:      General: No focal deficit present.      Mental Status: She is alert.   Psychiatric:         Mood and Affect: Mood normal.        Result Review :           Assessment and Plan   Diagnoses and all orders for this visit:    1. Pain of both hip joints (Primary)    2. Abnormal thyroid function test  -     Thyroid Peroxidase Antibody  -     TSH      Evaluate with previously planned labs and re-draw TPO and TSH.  Discussed challenges of symptoms being due to underlying autoimmunity vs now possibly 2/2 first trimester symptoms, specifically relaxin hormonal effects.  Will f/u labs and plan f/u and any further evaluation based off these results.          Follow Up   No follow-ups on file.  Patient was given instructions and counseling regarding her condition or for health " maintenance advice. Please see specific information pulled into the AVS if appropriate.

## 2025-04-29 LAB
THYROPEROXIDASE AB SERPL-ACNC: 45 IU/ML (ref 0–34)
TSH SERPL DL<=0.005 MIU/L-ACNC: 2.13 UIU/ML (ref 0.45–4.5)

## 2025-05-05 ENCOUNTER — OFFICE VISIT (OUTPATIENT)
Dept: OBSTETRICS AND GYNECOLOGY | Facility: CLINIC | Age: 25
End: 2025-05-05
Payer: COMMERCIAL

## 2025-05-05 VITALS
DIASTOLIC BLOOD PRESSURE: 76 MMHG | HEIGHT: 67 IN | WEIGHT: 175 LBS | BODY MASS INDEX: 27.47 KG/M2 | SYSTOLIC BLOOD PRESSURE: 112 MMHG

## 2025-05-05 DIAGNOSIS — O09.299 PRIOR FETAL MACROSOMIA, ANTEPARTUM: ICD-10-CM

## 2025-05-05 DIAGNOSIS — K83.1 CHOLESTASIS: ICD-10-CM

## 2025-05-05 DIAGNOSIS — N92.6 MISSED MENSES: Primary | ICD-10-CM

## 2025-05-05 DIAGNOSIS — R76.8 ANTI-TPO ANTIBODIES PRESENT: ICD-10-CM

## 2025-05-05 DIAGNOSIS — Z32.01 POSITIVE URINE PREGNANCY TEST: ICD-10-CM

## 2025-05-05 LAB
B-HCG UR QL: POSITIVE
BILIRUB BLD-MCNC: NEGATIVE MG/DL
CLARITY, POC: CLEAR
COLOR UR: YELLOW
EXPIRATION DATE: ABNORMAL
GLUCOSE UR STRIP-MCNC: NEGATIVE MG/DL
INTERNAL NEGATIVE CONTROL: ABNORMAL
INTERNAL POSITIVE CONTROL: ABNORMAL
KETONES UR QL: NEGATIVE
LEUKOCYTE EST, POC: NEGATIVE
Lab: ABNORMAL
NITRITE UR-MCNC: NEGATIVE MG/ML
PH UR: 6 [PH] (ref 5–8)
PROT UR STRIP-MCNC: NEGATIVE MG/DL
RBC # UR STRIP: NEGATIVE /UL
SP GR UR: 1.03 (ref 1–1.03)
UROBILINOGEN UR QL: NORMAL

## 2025-05-05 RX ORDER — MV-MN 110/FA/OM3/DHA/EPA/FISH 180-35-25
1 TABLET,CHEWABLE ORAL DAILY
Qty: 90 TABLET | Refills: 3 | Status: SHIPPED | OUTPATIENT
Start: 2025-05-05

## 2025-05-05 NOTE — PROGRESS NOTES
Confirmation of pregnancy     Chief Complaint   Patient presents with    Amenorrhea         Andrea OQUENDO is being seen today for evaluation of absence of menses. Due to her period being late, she tested for pregnancy and had + home UPT. She is a 25 y.o. . This problem is new to me, the examiner.       OB History          4    Para   2    Term   2            AB   1    Living   2         SAB        IAB        Ectopic        Molar        Multiple        Live Births   2          Obstetric Comments   2 , cholestasis in first pregnancy.   SAB x 1  Proven pelvis to 8.10#                LNMP: 3/13/25  Confident with date: Yes  Taking prenatal vitamins: No. Needs RX: Yes  Planned pregnancy: Yes  Prior obstetric issues, potential pregnancy concerns: SAB x 1.  x 2. H/o 8.10# infant. Cholestasis.   Family history of genetic issues (includes FOB): denies   Varicella Hx: Non immune   Flu vaccine: has not had   COVID 19 vaccine: has not had. Booster vaccine: has not had  History of STDs: denies   Current medications: Vit D  Last pap smear: Feels like she needs a pap   Smoker: No  Drug or alcohol abuse: No  H/O physical, emotional or sexual abuse:denies   H/O mental health disorder: anxiety   Prior testing for Cystic Fibrosis Carrier or Sickle Cell Trait- denies   Prepregnancy BMI - Body mass index is 27.41 kg/m².    Past Medical History:   Diagnosis Date    Anxiety        Past Surgical History:   Procedure Laterality Date    TONSILLECTOMY           Current Outpatient Medications:     ergocalciferol (ERGOCALCIFEROL) 1.25 MG (08246 UT) capsule, Take 1 capsule by mouth., Disp: , Rfl:     Prenatal MV & Min w/FA-DHA ([facility] Prenatal Gummy) 0.18-25 MG chewable tablet, Chew 1 each Daily., Disp: 90 tablet, Rfl: 3    Allergies   Allergen Reactions    Zoloft [Sertraline] GI Intolerance     ABD pain    Penicillins Rash    Sulfa Antibiotics Rash       Social History     Socioeconomic History    Marital status:   "  Tobacco Use    Smoking status: Never    Smokeless tobacco: Never   Vaping Use    Vaping status: Never Used   Substance and Sexual Activity    Alcohol use: Never    Drug use: Never    Sexual activity: Yes     Partners: Male     Birth control/protection: None       Family History   Problem Relation Age of Onset    Kidney disease Father     Deep vein thrombosis Maternal Grandfather     Heart attack Maternal Grandfather     Kidney disease Paternal Grandmother     Breast cancer Neg Hx     Ovarian cancer Neg Hx     Uterine cancer Neg Hx     Colon cancer Neg Hx        Review of systems     Review of Systems   Constitutional:  Positive for fatigue.   Gastrointestinal:  Positive for nausea.   Genitourinary:  Positive for breast pain and menstrual problem.       Objective    /76   Ht 170.2 cm (67\")   Wt 79.4 kg (175 lb)   LMP 03/13/2025 (Exact Date)   BMI 27.41 kg/m²     Physical Exam  Vitals and nursing note reviewed.   Constitutional:       Appearance: Normal appearance.   Musculoskeletal:         General: Normal range of motion.   Skin:     General: Skin is warm and dry.   Neurological:      General: No focal deficit present.      Mental Status: She is alert and oriented to person, place, and time.   Psychiatric:         Mood and Affect: Mood normal.         Behavior: Behavior normal.         Assessment/Plan      Missed menses: + UPT in office. LNMP 3/13/25 = 7.4 week EGA with an EDC=12/18/25. Oriented to practice. US today shows a single, viable IUP @ 6.1 weeks.  EDC 12/28/25. EDC by [facility] rejected.     Pregnancy: Disc importance of regular prenatal care. Enc PNV daily. Counseled on providers and on call phone. Disc Tylenol products are ok and encouraged no ibuprofen or ASA in pregnancy.  Disc exercise in pregnancy, diet, expected weight gain, etc. Enc no use of tobacco, vaping, drugs, or alcohol during pregnancy. Rev kianna s/s of SAB.     Labs: Pt counseled on genetic screening, Quad screen, AFP, and NIPS. "     Body mass index is 27.41 kg/m². Overweight women, with a BMI of 25-29, should gain approx 15-25 pounds for the entire pregnancy.     Smoker- non smoker    6.   COVID19  vaccine declined    7.  Flu vaccine declined     8.  H/O cholestasis- Check baseline CMP with new OB labs.     9.  Varicella non immune- Per pt report. Disc vaccine not safe in pregnancy. Needs vaccine postpartum. Avoid people with fever and rash.     10.  Rubella non immune- Per pt report. Disc vaccine not safe in pregnancy. Needs vaccine postpartum. Avoid people with fever and rash.     11.  Nausea- Enc small frequent meals. Declines RX.     12.  Hashimoto's- No endocrinology consult.  Thyroid function is normal 4/25 with PCP.     13.  H/O SAB x 1     14.  H/O 8.10# infant     All questions answered.     Encounter Diagnoses   Name Primary?    Missed menses Yes    Positive urine pregnancy test     H/O Cholestasis     Anti-TPO antibodies present     Prior fetal macrosomia, antepartum: H/O 8.10# infant        Diagnoses and all orders for this visit:    Missed menses  -     POC Urinalysis Dipstick  -     POC Pregnancy, Urine    Positive urine pregnancy test    H/O Cholestasis    Anti-TPO antibodies present    Prior fetal macrosomia, antepartum: H/O 8.10# infant    Other orders  -     Prenatal MV & Min w/FA-DHA ([facility] Prenatal [facility]) 0.18-25 MG chewable tablet; Chew 1 each Daily.        RTO in 2 weeks for new OB exam, labs and US- viability     JAY Ott  5/5/2025  12:12 EDT

## 2025-05-12 RX ORDER — ONDANSETRON 4 MG/1
4 TABLET, FILM COATED ORAL EVERY 8 HOURS PRN
Qty: 30 TABLET | Refills: 1 | Status: SHIPPED | OUTPATIENT
Start: 2025-05-12 | End: 2026-05-12

## 2025-05-12 RX ORDER — DIPHENHYDRAMINE HYDROCHLORIDE 25 MG/1
25 CAPSULE ORAL NIGHTLY
Qty: 30 TABLET | Refills: 1 | Status: SHIPPED | OUTPATIENT
Start: 2025-05-12

## 2025-05-12 RX ORDER — DOXYLAMINE SUCCINATE 25 MG/1
25 TABLET ORAL NIGHTLY
Qty: 30 TABLET | Refills: 1 | Status: SHIPPED | OUTPATIENT
Start: 2025-05-12

## 2025-05-19 ENCOUNTER — OFFICE VISIT (OUTPATIENT)
Dept: INTERNAL MEDICINE | Facility: CLINIC | Age: 25
End: 2025-05-19
Payer: COMMERCIAL

## 2025-05-19 VITALS
HEART RATE: 82 BPM | DIASTOLIC BLOOD PRESSURE: 62 MMHG | SYSTOLIC BLOOD PRESSURE: 106 MMHG | OXYGEN SATURATION: 99 % | WEIGHT: 177.6 LBS | BODY MASS INDEX: 27.88 KG/M2 | TEMPERATURE: 97.8 F | HEIGHT: 67 IN

## 2025-05-19 DIAGNOSIS — L30.9 DERMATITIS: Primary | ICD-10-CM

## 2025-05-19 PROCEDURE — 99213 OFFICE O/P EST LOW 20 MIN: CPT | Performed by: NURSE PRACTITIONER

## 2025-05-19 RX ORDER — TRIAMCINOLONE ACETONIDE 0.25 MG/G
1 CREAM TOPICAL 2 TIMES DAILY
Qty: 15 G | Refills: 0 | Status: SHIPPED | OUTPATIENT
Start: 2025-05-19 | End: 2025-05-26

## 2025-05-19 NOTE — PATIENT INSTRUCTIONS
Dermatology:    [facility]  [phone]      [facility]  [phone]

## 2025-05-19 NOTE — PROGRESS NOTES
"Andrea OQUENDO is a 25 y.o. female presenting today for   Chief Complaint   Patient presents with    Rash     On face, red and itchy. Started Thursday and is getting worse.       Subjective    Rash       Notes scattered red bumps on chin that began 5 days ago. Very itchy. In first trimester of 3rd pregnancy. No new envi exposures.    The following portions of the patient's history were reviewed and updated as appropriate: allergies, current medications, problem list, past medical history, past surgical history, family history, and social history.          Objective    Vitals:    05/19/25 1456   BP: 106/62   BP Location: Right arm   Patient Position: Sitting   Cuff Size: Adult   Pulse: 82   Temp: 97.8 °F (36.6 °C)   TempSrc: Infrared   SpO2: 99%   Weight: 80.6 kg (177 lb 9.6 oz)   Height: 170.2 cm (67\")     Body mass index is 27.82 kg/m².  Nursing notes and vitals reviewed.    Physical Exam  Constitutional:       General: She is not in acute distress.     Appearance: She is well-developed.   Pulmonary:      Effort: Pulmonary effort is normal.   Skin:            Comments: Scattered erythematous papules   Neurological:      Mental Status: She is alert.   Psychiatric:         Attention and Perception: She is attentive.         Speech: Speech normal.           Data Reviewed:      Assessment and Plan:         Dermatitis    Orders:    triamcinolone (KENALOG) 0.025 % cream; Apply 1 Application topically to the appropriate area as directed 2 (Two) Times a Day for 7 days.          Medications, including side effects, were discussed with the patient. Patient verbalized understanding.  The plan of care was discussed. All questions were answered. Patient verbalized understanding.        Return if symptoms worsen or fail to improve.  " Visit Vitals  BP (!) 117/52 (BP 1 Location: Left upper arm, BP Patient Position: Sitting)   Pulse (!) 57   Resp 16   Ht 5' 6\" (1.676 m)   Wt 170 lb (77.1 kg)   SpO2 98%   BMI 27.44 kg/m²       Pt c/o pain in both breast that comes and goes. It started last month in the L breast and the nipple was hard and painful.

## 2025-05-20 ENCOUNTER — INITIAL PRENATAL (OUTPATIENT)
Dept: OBSTETRICS AND GYNECOLOGY | Facility: CLINIC | Age: 25
End: 2025-05-20
Payer: COMMERCIAL

## 2025-05-20 VITALS — DIASTOLIC BLOOD PRESSURE: 70 MMHG | WEIGHT: 177 LBS | BODY MASS INDEX: 27.72 KG/M2 | SYSTOLIC BLOOD PRESSURE: 100 MMHG

## 2025-05-20 DIAGNOSIS — R76.8 ANTI-TPO ANTIBODIES PRESENT: ICD-10-CM

## 2025-05-20 DIAGNOSIS — Z34.91 INITIAL OBSTETRIC VISIT IN FIRST TRIMESTER: ICD-10-CM

## 2025-05-20 DIAGNOSIS — K83.1 CHOLESTASIS: ICD-10-CM

## 2025-05-20 DIAGNOSIS — Z01.419 PAP SMEAR, AS PART OF ROUTINE GYNECOLOGICAL EXAMINATION: ICD-10-CM

## 2025-05-20 DIAGNOSIS — O09.299 PRIOR FETAL MACROSOMIA, ANTEPARTUM: ICD-10-CM

## 2025-05-20 DIAGNOSIS — Z36.9 ENCOUNTER FOR ANTENATAL SCREENING, UNSPECIFIED: Primary | ICD-10-CM

## 2025-05-20 LAB
BILIRUB BLD-MCNC: NEGATIVE MG/DL
CLARITY, POC: CLEAR
COLOR UR: YELLOW
GLUCOSE UR STRIP-MCNC: NEGATIVE MG/DL
KETONES UR QL: NEGATIVE
LEUKOCYTE EST, POC: NEGATIVE
NITRITE UR-MCNC: NEGATIVE MG/ML
PH UR: 5 [PH] (ref 5–8)
PROT UR STRIP-MCNC: NEGATIVE MG/DL
RBC # UR STRIP: NEGATIVE /UL
SP GR UR: 1.03 (ref 1–1.03)
UROBILINOGEN UR QL: NORMAL

## 2025-05-20 NOTE — PROGRESS NOTES
Initial ob visit     Chief Complaint   Patient presents with    Initial Prenatal Visit       Andrea OQUENDO is being seen today for her first obstetrical visit.  She is a 25 y.o.    8w2d gestation. This problem is new to me: no. She has a rash that started last week. She was seen by her PCP yesterday.   She was ref to derm. The rash is itchy and she feels it is getting worse.     OB History          4    Para   2    Term   2            AB   1    Living   2         SAB        IAB        Ectopic        Molar        Multiple        Live Births   2          Obstetric Comments   2 , cholestasis in first pregnancy.   SAB x 1  Proven pelvis to 8.10#                LNMP: 3/13/25  Confident with date: Yes  Taking prenatal vitamins: No. Needs RX: Yes  Planned pregnancy: Yes  Prior obstetric issues, potential pregnancy concerns: SAB x 1.  x 2. H/o 8.10# infant. Cholestasis.   Family history of genetic issues (includes FOB): denies   Varicella Hx: Non immune   Flu vaccine: has not had   COVID 19 vaccine: has not had. Booster vaccine: has not had  History of STDs: denies   Current medications: Vit D  Last pap smear: Feels like she needs a pap   Smoker: No  Drug or alcohol abuse: No  H/O physical, emotional or sexual abuse:denies   H/O mental health disorder: anxiety   Prior testing for Cystic Fibrosis Carrier or Sickle Cell Trait- denies   Prepregnancy BMI: Body mass index is 27.72 kg/m².      Past Medical History:   Diagnosis Date    Anxiety        Past Surgical History:   Procedure Laterality Date    TONSILLECTOMY           Current Outpatient Medications:     doxylamine (Unisom SleepTabs) 25 MG tablet, Take 1 tablet by mouth Every Night., Disp: 30 tablet, Rfl: 1    ergocalciferol (ERGOCALCIFEROL) 1.25 MG (07855 UT) capsule, Take 1 capsule by mouth., Disp: , Rfl:     ondansetron (Zofran) 4 MG tablet, Take 1 tablet by mouth Every 8 (Eight) Hours As Needed for Nausea or Vomiting., Disp: 30 tablet, Rfl: 1     Prenatal MV & Min w/FA-DHA ([facility] Prenatal Gummy) 0.18-25 MG chewable tablet, Chew 1 each Daily., Disp: 90 tablet, Rfl: 3    triamcinolone (KENALOG) 0.025 % cream, Apply 1 Application topically to the appropriate area as directed 2 (Two) Times a Day for 7 days., Disp: 15 g, Rfl: 0    vitamin B-6 (PYRIDOXINE) 25 MG tablet, Take 1 tablet by mouth Every Night., Disp: 30 tablet, Rfl: 1    Allergies   Allergen Reactions    Zoloft [Sertraline] GI Intolerance     ABD pain    Penicillins Rash    Sulfa Antibiotics Rash       Social History     Socioeconomic History    Marital status:    Tobacco Use    Smoking status: Never     Passive exposure: Never    Smokeless tobacco: Never   Vaping Use    Vaping status: Never Used   Substance and Sexual Activity    Alcohol use: Never    Drug use: Never    Sexual activity: Yes     Partners: Male     Birth control/protection: None       Family History   Problem Relation Age of Onset    Kidney disease Father     Deep vein thrombosis Maternal Grandfather     Heart attack Maternal Grandfather     Kidney disease Paternal Grandmother     Breast cancer Neg Hx     Ovarian cancer Neg Hx     Uterine cancer Neg Hx     Colon cancer Neg Hx        Review of systems     All other systems reviewed and are negative except for: Integument/breast: positive for rash     Objective    Wt 80.3 kg (177 lb)   LMP 03/13/2025 (Exact Date)   BMI 27.72 kg/m²       General Appearance:    Alert, cooperative, in no acute distress, habitus overweight    Head:    Erythematous papules noted on jaw line and lower aspect of cheeks bilateral    Eyes:           Not examined   Ears:  Not examined       Neck:  No thyroid enlargement or nodules present   Back:     No kyphosis present, no scoliosis present,                       Lungs:     Clear to auscultation,respirations regular, even and                   unlabored    Heart:    Regular rhythm and normal rate, normal S1 and S2, no            murmur, no gallop, no rub,  no click   Breast Exam:    No masses, No nipple discharge   Abdomen:     Normal bowel sounds, no masses, no organomegaly, soft        non-tender, non-distended, no guarding, no rebound                 tenderness   Genitalia:    Vulva - No masses, no atrophy, no lesions    Vagina - No discharge, No bleeding    Cervix - No Lesions, closed. Pap collected:Yes     Uterus - Consistent with 8 weeks.     Adnexa - No masses, non tender       Extremities:   Moves all extremities well, no edema, no cyanosis, no              redness       Skin:   No bleeding, bruising or rash       Neurologic:   Sensation intact, A&O times 3      Assessment/Plan    1) Pregnancy at 8w2d- US today shows a single, viable IUP @ 8.4 weeks.  bpm. Both ovaries wnl. EDC 12/28/25 based on 6.1 weeks US . US findings discussed with patient. EDC established 12/28/25 and confirmed by US. EDC by LN rejected.     2) OB exam: OB exam completed: Yes. New OB bag provided Yes. Pap collected: Yes.    3) Labs: OB labs collected: Yes Counseled on genetic screening: Yes, she desires CF/SMA/FX. Counseled on Quad screen and AFP: No, she is to early for AFP. Counseled on NIPS: Yes, she is to early for NIPS.      4) Body mass index is 27.72 kg/m². Overweight women, with a BMI of 25-29, should gain approx 15-25 pounds for the entire pregnancy.     5)  Prenatal care: Oriented to the office and prenatal care. Encourage prenatal vitamins. Disc Tylenol products are fine, avoid aspirin and ibuprofen; Zika (travel restrictions/ok to use insect repellant); not to change cat litter; food restrictions; exercise;  avoidance of alcohol, tobacco, drugs and saunas/hot tubs.     6.  COVID19  vaccine declined     7.  Flu vaccine declined      8.  H/O cholestasis- Check baseline CMP with new OB labs.      9.  Varicella non immune- Per pt report. Disc vaccine not safe in pregnancy. Needs vaccine postpartum. Avoid people with fever and rash.      10.  Rubella non immune- Per pt  report. Disc vaccine not safe in pregnancy. Needs vaccine postpartum. Avoid people with fever and rash.      11.  Nausea- Enc small frequent meals. Declines RX.      12.  Hashimoto's- No endocrinology consult.  Thyroid function is normal 4/25 with PCP.      13.  H/O SAB x 1      14.  H/O 8.10# infant- Watch growth 3rd trimester     15.  Rash- Has upcoming appt with derm. Using triamcinolone cream from PCP. Enc claritin or zyrtec as well.        All questions answered.     RTO 4 weeks for OB chuck and LUCASS     Verona Ayoub, APRN  5/20/2025  09:29 EDT

## 2025-05-23 LAB
AMPHETAMINES UR QL SCN: NEGATIVE NG/ML
BACTERIA UR CULT: NORMAL
BACTERIA UR CULT: NORMAL
BARBITURATES UR QL SCN: NEGATIVE NG/ML
BENZODIAZ UR QL SCN: NEGATIVE NG/ML
BUPRENORPHINE UR QL: NEGATIVE NG/ML
BZE UR QL SCN: NEGATIVE NG/ML
CANNABINOIDS UR QL SCN: NEGATIVE NG/ML
CREAT UR-MCNC: 88.9 MG/DL (ref 20–300)
FENTANYL UR-MCNC: NEGATIVE PG/ML
LABORATORY COMMENT REPORT: NORMAL
MEPERIDINE UR QL: NEGATIVE NG/ML
METHADONE UR QL SCN: NEGATIVE NG/ML
OPIATES UR QL SCN: NEGATIVE NG/ML
OXYCODONE+OXYMORPHONE UR QL SCN: NEGATIVE NG/ML
PCP UR QL: NEGATIVE NG/ML
PH UR: 8.8 [PH] (ref 4.5–8.9)
PROPOXYPH UR QL SCN: NEGATIVE NG/ML
TRAMADOL UR QL SCN: NEGATIVE NG/ML

## 2025-05-29 LAB
C TRACH RRNA CVX QL NAA+PROBE: NEGATIVE
CONV .: ABNORMAL
CYTOLOGIST CVX/VAG CYTO: ABNORMAL
CYTOLOGY CVX/VAG DOC CYTO: ABNORMAL
CYTOLOGY CVX/VAG DOC THIN PREP: ABNORMAL
DX ICD CODE: ABNORMAL
DX ICD CODE: ABNORMAL
HPV I/H RISK 4 DNA CVX QL PROBE+SIG AMP: POSITIVE
N GONORRHOEA RRNA CVX QL NAA+PROBE: NEGATIVE
OTHER STN SPEC: ABNORMAL
PATHOLOGIST CVX/VAG CYTO: ABNORMAL
RECOM F/U CVX/VAG CYTO: ABNORMAL
SERVICE CMNT-IMP: ABNORMAL
STAT OF ADQ CVX/VAG CYTO-IMP: ABNORMAL
T VAGINALIS RRNA SPEC QL NAA+PROBE: NEGATIVE

## 2025-06-09 LAB
ABO GROUP BLD: NORMAL
ALBUMIN SERPL-MCNC: 4 G/DL (ref 4–5)
ALP SERPL-CCNC: 107 IU/L (ref 44–121)
ALT SERPL-CCNC: 33 IU/L (ref 0–32)
AST SERPL-CCNC: 25 IU/L (ref 0–40)
BASOPHILS # BLD AUTO: 0 X10E3/UL (ref 0–0.2)
BASOPHILS NFR BLD AUTO: 0 %
BILIRUB SERPL-MCNC: 0.6 MG/DL (ref 0–1.2)
BLD GP AB SCN SERPL QL: NEGATIVE
BUN SERPL-MCNC: 6 MG/DL (ref 6–20)
BUN/CREAT SERPL: 10 (ref 9–23)
CALCIUM SERPL-MCNC: 9 MG/DL (ref 8.7–10.2)
CHLORIDE SERPL-SCNC: 103 MMOL/L (ref 96–106)
CITATION REF LAB TEST: NORMAL
CO2 SERPL-SCNC: 19 MMOL/L (ref 20–29)
CREAT SERPL-MCNC: 0.62 MG/DL (ref 0.57–1)
EGFRCR SERPLBLD CKD-EPI 2021: 125 ML/MIN/1.73
EOSINOPHIL # BLD AUTO: 0.2 X10E3/UL (ref 0–0.4)
EOSINOPHIL NFR BLD AUTO: 2 %
ERYTHROCYTE [DISTWIDTH] IN BLOOD BY AUTOMATED COUNT: 12.8 % (ref 11.7–15.4)
GENDER IDENTITY: NORMAL
GENE DIS ANL CARRIER INTERP-IMP: NORMAL
GENE STUDIED ID: NORMAL
GENETIC SCN SPEC: NORMAL
GLOBULIN SER CALC-MCNC: 2.7 G/DL (ref 1.5–4.5)
GLUCOSE SERPL-MCNC: 84 MG/DL (ref 70–99)
HBA1C MFR BLD: 5.1 % (ref 4.8–5.6)
HBV SURFACE AG SERPL QL IA: NEGATIVE
HCT VFR BLD AUTO: 38.8 % (ref 34–46.6)
HCV IGG SERPL QL IA: NON REACTIVE
HGB A MFR BLD ELPH: 97.3 % (ref 96.4–98.8)
HGB A2 MFR BLD ELPH: 2.7 % (ref 1.8–3.2)
HGB BLD-MCNC: 12.1 G/DL (ref 11.1–15.9)
HGB F MFR BLD ELPH: 0 % (ref 0–2)
HGB FRACT BLD-IMP: NORMAL
HGB S MFR BLD ELPH: 0 %
HIV 1+2 AB+HIV1 P24 AG SERPL QL IA: NON REACTIVE
IMM GRANULOCYTES # BLD AUTO: 0 X10E3/UL (ref 0–0.1)
IMM GRANULOCYTES NFR BLD AUTO: 0 %
LAB DIRECTOR NAME PROVIDER: NORMAL
LYMPHOCYTES # BLD AUTO: 1.3 X10E3/UL (ref 0.7–3.1)
LYMPHOCYTES NFR BLD AUTO: 18 %
Lab: NORMAL
MCH RBC QN AUTO: 27.9 PG (ref 26.6–33)
MCHC RBC AUTO-ENTMCNC: 31.2 G/DL (ref 31.5–35.7)
MCV RBC AUTO: 89 FL (ref 79–97)
MONOCYTES # BLD AUTO: 0.5 X10E3/UL (ref 0.1–0.9)
MONOCYTES NFR BLD AUTO: 6 %
NEUTROPHILS # BLD AUTO: 5.2 X10E3/UL (ref 1.4–7)
NEUTROPHILS NFR BLD AUTO: 74 %
PLATELET # BLD AUTO: 353 X10E3/UL (ref 150–450)
POTASSIUM SERPL-SCNC: 4.2 MMOL/L (ref 3.5–5.2)
PROT SERPL-MCNC: 6.7 G/DL (ref 6–8.5)
RBC # BLD AUTO: 4.34 X10E6/UL (ref 3.77–5.28)
REASON FOR REFERRAL (NARRATIVE): NORMAL
RECOMMENDATION PATIENT DOC-IMP: NORMAL
REF LAB TEST METHOD: NORMAL
RH BLD: POSITIVE
RPR SER QL: NON REACTIVE
RUBV IGG SERPL IA-ACNC: <0.9 INDEX
SERVICE CMNT-IMP: NORMAL
SODIUM SERPL-SCNC: 135 MMOL/L (ref 134–144)
SPECIMEN SOURCE: NORMAL
VZV IGG SER QL IA: REACTIVE
WBC # BLD AUTO: 7.2 X10E3/UL (ref 3.4–10.8)

## 2025-06-10 PROBLEM — Z28.39 RUBELLA NON-IMMUNE STATUS, ANTEPARTUM: Status: ACTIVE | Noted: 2025-06-10

## 2025-06-10 PROBLEM — O09.899 RUBELLA NON-IMMUNE STATUS, ANTEPARTUM: Status: ACTIVE | Noted: 2025-06-10

## 2025-06-10 PROBLEM — R74.8 ELEVATED LIVER ENZYMES: Status: ACTIVE | Noted: 2025-06-10

## 2025-06-16 ENCOUNTER — ROUTINE PRENATAL (OUTPATIENT)
Dept: OBSTETRICS AND GYNECOLOGY | Facility: CLINIC | Age: 25
End: 2025-06-16
Payer: COMMERCIAL

## 2025-06-16 VITALS — BODY MASS INDEX: 28.51 KG/M2 | SYSTOLIC BLOOD PRESSURE: 102 MMHG | WEIGHT: 182 LBS | DIASTOLIC BLOOD PRESSURE: 70 MMHG

## 2025-06-16 DIAGNOSIS — Z34.82 PRENATAL CARE, SUBSEQUENT PREGNANCY IN SECOND TRIMESTER: Primary | ICD-10-CM

## 2025-06-16 DIAGNOSIS — O09.899 RUBELLA NON-IMMUNE STATUS, ANTEPARTUM: ICD-10-CM

## 2025-06-16 DIAGNOSIS — Z87.19 HISTORY OF CHOLESTASIS DURING PREGNANCY: ICD-10-CM

## 2025-06-16 DIAGNOSIS — Z36.9 ENCOUNTER FOR ANTENATAL SCREENING, UNSPECIFIED: ICD-10-CM

## 2025-06-16 DIAGNOSIS — B97.7 HPV IN FEMALE: ICD-10-CM

## 2025-06-16 DIAGNOSIS — E06.3 HASHIMOTO'S DISEASE: ICD-10-CM

## 2025-06-16 DIAGNOSIS — R74.8 ELEVATED LIVER ENZYMES: ICD-10-CM

## 2025-06-16 DIAGNOSIS — Z28.39 RUBELLA NON-IMMUNE STATUS, ANTEPARTUM: ICD-10-CM

## 2025-06-16 DIAGNOSIS — Z87.59 HISTORY OF CHOLESTASIS DURING PREGNANCY: ICD-10-CM

## 2025-06-16 LAB
BILIRUB BLD-MCNC: NEGATIVE MG/DL
CLARITY, POC: CLEAR
COLOR UR: YELLOW
DEPRECATED FRAXE GENE CGG RPT BLD/T QL: NORMAL
EXTERNAL NIPT: NORMAL
GLUCOSE UR STRIP-MCNC: NEGATIVE MG/DL
KETONES UR QL: NEGATIVE
LEUKOCYTE EST, POC: NEGATIVE
NITRITE UR-MCNC: NEGATIVE MG/ML
NTRA CYSTIC FIBROSIS: NORMAL
NTRA SPINAL MUSCULAR ATROPHY: NORMAL
PH UR: 5 [PH] (ref 5–8)
PROT UR STRIP-MCNC: ABNORMAL MG/DL
RBC # UR STRIP: NEGATIVE /UL
SP GR UR: 1.01 (ref 1–1.03)
UROBILINOGEN UR QL: ABNORMAL

## 2025-06-16 NOTE — PROGRESS NOTES
OB follow up < 20 weeks    Chief Complaint   Patient presents with    Routine Prenatal Visit       Andrea OQUENDO is a 25 y.o.  12w1d being seen today for her obstetrical visit.  Patient reports no complaints. Taking +PNV.  This is the first time I am seeing this patient in this pregnancy and all of her problems are new to me, the examiner.      Review of Systems  Genitourinary: Neg for cramping, vaginal bleeding, SROM, or dysuria.   Allergies   Allergen Reactions    Zoloft [Sertraline] GI Intolerance     ABD pain    Penicillins Rash    Sulfa Antibiotics Rash       /70   Wt 82.6 kg (182 lb)   LMP 2025 (Exact Date)   BMI 28.51 kg/m²     FHT: 172 BPM   Uterine Size:      Assessment    1) Pregnancy at 12w1d     2) CF/FX/SMA neg, check MT21- no gender; too soon for AFP    3) Elevated ALT- DONNA scheduled for 25; check hepatic function panel    4) RNI- avoid people with fever/rash; can get vaccine once she delivers    5) HPV+ - noted on recent Pap; already received vaccine;  needs colpo at next visit    6) N/V resolved    7) H/O 8.10# infant- Watch growth 3rd trimester     8) Hashimoto's- No endocrinology consult. Thyroid function is normal  with PCP.     9) H/O cholestasis- Baseline CMP with elevated ALT=33.  Check hepatic function panel today.       Plan    Continue prenatal vitamins   Reviewed this stage of pregnancy  Problem list updated   Follow up in 4 weeks    Parts of this document have been copied or forwarded from her previous visits and have been reviewed, updated and edited as indicated.      Kiersten Melgar, APRN     2025  12:48 EDT

## 2025-06-17 PROBLEM — Z87.19 HISTORY OF CHOLESTASIS DURING PREGNANCY: Status: ACTIVE | Noted: 2025-06-17

## 2025-06-17 PROBLEM — B97.7 HPV IN FEMALE: Status: ACTIVE | Noted: 2025-06-17

## 2025-06-17 PROBLEM — Z87.59 HISTORY OF CHOLESTASIS DURING PREGNANCY: Status: ACTIVE | Noted: 2025-06-17

## 2025-06-17 LAB
ALBUMIN SERPL-MCNC: 4 G/DL (ref 4–5)
ALP SERPL-CCNC: 139 IU/L (ref 44–121)
ALT SERPL-CCNC: 32 IU/L (ref 0–32)
AST SERPL-CCNC: 24 IU/L (ref 0–40)
BILIRUB DIRECT SERPL-MCNC: 0.11 MG/DL (ref 0–0.4)
BILIRUB SERPL-MCNC: 0.2 MG/DL (ref 0–1.2)
HAV IGM SERPL QL IA: NEGATIVE
HBV CORE IGM SERPL QL IA: NEGATIVE
HBV SURFACE AG SERPL QL IA: NEGATIVE
HCV AB SERPL QL IA: NON REACTIVE
HCV AB SERPL QL IA: NORMAL
PROT SERPL-MCNC: 6.7 G/DL (ref 6–8.5)

## 2025-06-20 LAB
CFDNA.FET/CFDNA.TOTAL SFR FETUS: NORMAL %
CITATION REF LAB TEST: NORMAL
FET 13+18+21+X+Y ANEUP PLAS.CFDNA: NEGATIVE
FET CHR 21 TS PLAS.CFDNA QL: NEGATIVE
FET SEX PLAS.CFDNA DOSAGE CFDNA: NORMAL
FET TS 13 RISK PLAS.CFDNA QL: NEGATIVE
FET TS 18 RISK WBC.DNA+CFDNA QL: NEGATIVE
GA EST FROM CONCEPTION DATE: NORMAL D
GESTATIONAL AGE > 9:: YES
LAB DIRECTOR NAME PROVIDER: NORMAL
LAB DIRECTOR NAME PROVIDER: NORMAL
LABORATORY COMMENT REPORT: NORMAL
LIMITATIONS OF THE TEST: NORMAL
NEGATIVE PREDICTIVE VALUE: NORMAL
PERFORMANCE CHARACTERISTICS: NORMAL
POSITIVE PREDICTIVE VALUE: NORMAL
REF LAB TEST METHOD: NORMAL
SERVICE CMNT-IMP: NORMAL
TEST PERFORMANCE INFO SPEC: NORMAL

## 2025-06-25 ENCOUNTER — HOSPITAL ENCOUNTER (OUTPATIENT)
Dept: ULTRASOUND IMAGING | Facility: HOSPITAL | Age: 25
Discharge: HOME | End: 2025-06-25
Admitting: NURSE PRACTITIONER
Payer: COMMERCIAL

## 2025-06-25 DIAGNOSIS — R74.8 ELEVATED LIVER ENZYMES: ICD-10-CM

## 2025-06-25 PROCEDURE — 76705 ECHO EXAM OF ABDOMEN: CPT

## 2025-07-08 ENCOUNTER — OFFICE VISIT (OUTPATIENT)
Dept: INTERNAL MEDICINE | Facility: CLINIC | Age: 25
End: 2025-07-08
Payer: COMMERCIAL

## 2025-07-08 ENCOUNTER — HOSPITAL ENCOUNTER (OUTPATIENT)
Dept: GENERAL RADIOLOGY | Facility: HOSPITAL | Age: 25
Discharge: HOME | End: 2025-07-08
Admitting: NURSE PRACTITIONER
Payer: COMMERCIAL

## 2025-07-08 VITALS
SYSTOLIC BLOOD PRESSURE: 116 MMHG | BODY MASS INDEX: 28.72 KG/M2 | HEIGHT: 67 IN | OXYGEN SATURATION: 99 % | HEART RATE: 89 BPM | TEMPERATURE: 97.7 F | DIASTOLIC BLOOD PRESSURE: 70 MMHG | WEIGHT: 183 LBS

## 2025-07-08 DIAGNOSIS — M25.561 ACUTE PAIN OF RIGHT KNEE: Primary | ICD-10-CM

## 2025-07-08 DIAGNOSIS — M25.361 PATELLOFEMORAL INSTABILITY OF RIGHT KNEE WITH PAIN: ICD-10-CM

## 2025-07-08 DIAGNOSIS — M25.561 PATELLOFEMORAL INSTABILITY OF RIGHT KNEE WITH PAIN: ICD-10-CM

## 2025-07-08 PROCEDURE — 99213 OFFICE O/P EST LOW 20 MIN: CPT | Performed by: NURSE PRACTITIONER

## 2025-07-08 PROCEDURE — 73562 X-RAY EXAM OF KNEE 3: CPT

## 2025-07-08 NOTE — PROGRESS NOTES
"Andrea OQUENDO is a 25 y.o. female presenting today for   Chief Complaint   Patient presents with    Knee Pain     Right swollen and warm to the touch, hurts the most when bent. Has been bothering her for about a month and will give out when she is walking. Sometimes the pain shoots up and down her leg.     Pt presents for an acute visit; her PCP is Dr. Huizar.    Subjective    Knee Pain          Presents c/o R knee pain x1mo. No injury. Onset was sudden. Pain is intermittent and seems to be gradually worsening. She has tried Tylenol. OTCs are limited 2/2 current pregnancy.    The following portions of the patient's history were reviewed and updated as appropriate: allergies, current medications, problem list, past medical history, past surgical history, family history, and social history.          Objective    Vitals:    07/08/25 1101   BP: 116/70   BP Location: Left arm   Patient Position: Sitting   Cuff Size: Adult   Pulse: 89   Temp: 97.7 °F (36.5 °C)   TempSrc: Infrared   SpO2: 99%   Weight: 83 kg (183 lb)   Height: 170.2 cm (67\")     Body mass index is 28.66 kg/m².  Nursing notes and vitals reviewed.    Physical Exam  Constitutional:       General: She is not in acute distress.     Appearance: She is well-developed.   Pulmonary:      Effort: Pulmonary effort is normal.   Musculoskeletal:      Right upper leg: Normal.      Right knee: Swelling, bony tenderness and crepitus present. No deformity or erythema. Decreased range of motion. Abnormal patellar mobility.   Neurological:      Mental Status: She is alert.   Psychiatric:         Attention and Perception: She is attentive.         Speech: Speech normal.           Data Reviewed:      Assessment and Plan:         Acute pain of right knee    Orders:    XR Knee 3 View Right    Ambulatory Referral to Physical Therapy for Evaluation & Treatment    Ambulatory Referral to Orthopedic Surgery    Patellofemoral instability of right knee with pain    Orders:    XR Knee 3 " View Right    Ambulatory Referral to Physical Therapy for Evaluation & Treatment    Ambulatory Referral to Orthopedic Surgery        - continue Tylenol 2/2 pregnancy  - compression sleeve to R knee  - ice or heat, whichever feels better      Medications, including side effects, were discussed with the patient. Patient verbalized understanding.  The plan of care was discussed. All questions were answered. Patient verbalized understanding.

## 2025-07-15 ENCOUNTER — ROUTINE PRENATAL (OUTPATIENT)
Dept: OBSTETRICS AND GYNECOLOGY | Facility: CLINIC | Age: 25
End: 2025-07-15
Payer: COMMERCIAL

## 2025-07-15 VITALS — DIASTOLIC BLOOD PRESSURE: 76 MMHG | BODY MASS INDEX: 28.72 KG/M2 | WEIGHT: 183.4 LBS | SYSTOLIC BLOOD PRESSURE: 120 MMHG

## 2025-07-15 DIAGNOSIS — Z34.82 PRENATAL CARE, SUBSEQUENT PREGNANCY IN SECOND TRIMESTER: Primary | ICD-10-CM

## 2025-07-15 DIAGNOSIS — Z36.9 ENCOUNTER FOR ANTENATAL SCREENING, UNSPECIFIED: ICD-10-CM

## 2025-07-15 DIAGNOSIS — E06.3 HASHIMOTO'S DISEASE: ICD-10-CM

## 2025-07-15 LAB
2ND TRIMESTER 4 SCREEN SERPL-IMP: NORMAL
AFP INTERP SERPL-IMP: NORMAL
BILIRUB BLD-MCNC: NEGATIVE MG/DL
CLARITY, POC: CLEAR
COLOR UR: YELLOW
GLUCOSE UR STRIP-MCNC: NEGATIVE MG/DL
KETONES UR QL: NEGATIVE
LEUKOCYTE EST, POC: NEGATIVE
NITRITE UR-MCNC: NEGATIVE MG/ML
PH UR: 5 [PH] (ref 5–8)
PROT UR STRIP-MCNC: NEGATIVE MG/DL
RBC # UR STRIP: NEGATIVE /UL
SP GR UR: 1 (ref 1–1.03)
UROBILINOGEN UR QL: NORMAL

## 2025-07-16 ENCOUNTER — TELEPHONE (OUTPATIENT)
Dept: OBSTETRICS AND GYNECOLOGY | Facility: CLINIC | Age: 25
End: 2025-07-16
Payer: COMMERCIAL

## 2025-07-16 NOTE — TELEPHONE ENCOUNTER
Pt had a referral for gastroenterology for elevated liver enymes and now she had a us liver and she was referred to General surgery, does she still need the Gastro referral?

## 2025-07-18 ENCOUNTER — OFFICE VISIT (OUTPATIENT)
Dept: ORTHOPEDIC SURGERY | Facility: CLINIC | Age: 25
End: 2025-07-18
Payer: COMMERCIAL

## 2025-07-18 VITALS
DIASTOLIC BLOOD PRESSURE: 70 MMHG | SYSTOLIC BLOOD PRESSURE: 90 MMHG | BODY MASS INDEX: 28.72 KG/M2 | HEIGHT: 67 IN | WEIGHT: 183 LBS | HEART RATE: 77 BPM

## 2025-07-18 DIAGNOSIS — M22.2X1 PATELLOFEMORAL PAIN SYNDROME OF RIGHT KNEE: ICD-10-CM

## 2025-07-18 DIAGNOSIS — M76.891 TENDINITIS OF RIGHT QUADRICEPS TENDON: Primary | ICD-10-CM

## 2025-07-18 PROCEDURE — 99213 OFFICE O/P EST LOW 20 MIN: CPT | Performed by: NURSE PRACTITIONER

## 2025-07-18 NOTE — PROGRESS NOTES
Subjective:     Patient ID: Andrea Smith is a 25 y.o. female.    Chief Complaint:  Right knee pain, new patient examiner  History of Present Illness  History of Present Illness  The patient is a 25-year-old female who presents to the clinic today for evaluation of her right knee. She had experiencing discomfort along the anterior aspect of the patella and distal quad approximately a month ago. She reports no known injury. Woke with symptoms, rates discomfort 5 out of 10, describes pain as throbbing, shooting, aching in nature. Feels as if the knee is going to give way. Pain worse with stepping up stairs and with extension and trying to complete a straight leg raise. No evidence I am sorry, no known injury. She is experiencing swelling and stiffness. Pain with standing, walking, icing, descending, stairs. She has tried ice, bracing, Tylenol. She is currently approximately 6 weeks. Unable to take any oral anti-inflammatory. She reports no prior injury to the knee in the past. She reports no prior imaging including CT and MRI, prior x-ray imaging completed, available for review, review in chart. She reports no other concerns.    She began experiencing discomfort in her right knee about a month ago, specifically along the anterior aspect of the patella and distal quad. She reports no known injury and describes the pain as throbbing, shooting, and aching, rating it at 5 out of 10. She also feels as if the knee is going to give way. The pain intensifies when she steps up stairs, extends her leg, or tries to complete a straight leg raise. She also experiences swelling and stiffness, and pain when standing, walking, icing, or descending stairs. She has attempted to alleviate the pain with ice, bracing, and Tylenol. She is currently 6 weeks pregnant and can not take any oral anti-inflammatories. She reports no prior injury to the knee and has not had any prior imaging including CT and MRI. Prior x-ray imaging completed,  "available for review, review in chart.         Social History     Occupational History    Not on file   Tobacco Use    Smoking status: Never     Passive exposure: Never    Smokeless tobacco: Never   Vaping Use    Vaping status: Never Used   Substance and Sexual Activity    Alcohol use: Never    Drug use: Never    Sexual activity: Yes     Partners: Male     Birth control/protection: None      Past Medical History:   Diagnosis Date    Anxiety     Hashimoto's disease      Past Surgical History:   Procedure Laterality Date    TONSILLECTOMY         Family History   Problem Relation Age of Onset    Kidney disease Father     Deep vein thrombosis Maternal Grandfather     Heart attack Maternal Grandfather     Kidney disease Paternal Grandmother     Breast cancer Neg Hx     Ovarian cancer Neg Hx     Uterine cancer Neg Hx     Colon cancer Neg Hx                Objective:  Physical Exam    Vital signs reviewed.   General: No acute distress.  Eyes: conjunctiva clear; pupils equally round and reactive  ENT: external ears and nose atraumatic; oropharynx clear  CV: no peripheral edema  Resp: normal respiratory effort  Skin: no rashes or wounds; normal turgor  Psych: mood and affect appropriate; recent and remote memory intact    Vitals:    07/18/25 0849   BP: 90/70   Pulse: 77   Weight: 83 kg (183 lb)   Height: 170.2 cm (67\")         07/18/25  0849   Weight: 83 kg (183 lb)     Body mass index is 28.66 kg/m².      Right Knee Exam     Tenderness   The patient is experiencing tenderness in the patella.    Range of Motion   Extension:  0   Right knee flexion: 125.     Tests   Delaney:  Medial - negative Lateral - negative  Varus: negative Valgus: negative  Lachman:  Anterior - 1+      Drawer:  Anterior - negative    Posterior - negative  Patellar apprehension: positive    Other   Erythema: absent  Sensation: normal  Pulse: present  Swelling: mild  Effusion: no effusion present    Comments:  Maximal tenderness present along the distal " quad insertion, no palpable abnormality along the distal quad insertion positive quad 5           Physical Exam      Imaging:  XR Knee 3 View Right  Result Date: 7/8/2025  Impression: 1. Mild narrowing of the medial joint compartment. No acute bony abnormality or joint effusion. Electronically Signed: Irvin Daugherty MD  7/8/2025 1:46 PM EDT  Workstation ID: [id number]    Independently reviewed MRI right knee mild narrowing medial compartment no other evidence of acute osseous abnormality       Assessment:        1. Tendinitis of right quadriceps tendon    2. Patellofemoral pain syndrome of right knee         Assessment & Plan  1. Right knee pain.  She reports experiencing discomfort along the anterior aspect of the patella and distal quad for approximately a month, with no known injury. The pain is described as throbbing, shooting, and aching, rated 5 out of 10. It worsens with activities such as stepping up stairs, extension, and straight leg raises. She also reports swelling and stiffness. She has tried ice, bracing, and Tylenol without significant relief. A plan of care was discussed with her. A referral to physical therapy will be made. Soft tissue massage is recommended. Quad firing exercises were demonstrated in the clinic for her to perform at home. Bracing will be held off for now. All questions were answered.    Follow-up  She will follow up if there is no improvement.    Orders:  Orders Placed This Encounter   Procedures    Ambulatory Referral to Physical Therapy for Evaluation & Treatment     No orders of the defined types were placed in this encounter.            Dragon dictation utilized          Patient or patient representative verbalized consent for the use of Ambient Listening during the visit with  JAY Balderas for chart documentation. 7/18/2025  09:08 EDT

## 2025-07-21 ENCOUNTER — PATIENT ROUNDING (BHMG ONLY) (OUTPATIENT)
Dept: ORTHOPEDIC SURGERY | Facility: CLINIC | Age: 25
End: 2025-07-21
Payer: COMMERCIAL

## 2025-07-21 NOTE — PROGRESS NOTES
A My-Chart message has been sent to the patient for PATIENT ROUNDING with [facility].

## 2025-08-12 ENCOUNTER — ROUTINE PRENATAL (OUTPATIENT)
Dept: OBSTETRICS AND GYNECOLOGY | Facility: CLINIC | Age: 25
End: 2025-08-12
Payer: COMMERCIAL

## 2025-08-12 VITALS — SYSTOLIC BLOOD PRESSURE: 104 MMHG | BODY MASS INDEX: 29.13 KG/M2 | WEIGHT: 186 LBS | DIASTOLIC BLOOD PRESSURE: 84 MMHG

## 2025-08-12 DIAGNOSIS — E06.3 HASHIMOTO'S DISEASE: ICD-10-CM

## 2025-08-12 DIAGNOSIS — B97.7 HPV IN FEMALE: ICD-10-CM

## 2025-08-12 DIAGNOSIS — Z28.39 RUBELLA NON-IMMUNE STATUS, ANTEPARTUM: ICD-10-CM

## 2025-08-12 DIAGNOSIS — O09.293 HISTORY OF MACROSOMIA IN INFANT IN PRIOR PREGNANCY, CURRENTLY PREGNANT IN THIRD TRIMESTER: ICD-10-CM

## 2025-08-12 DIAGNOSIS — Z36.9 ENCOUNTER FOR ANTENATAL SCREENING, UNSPECIFIED: Primary | ICD-10-CM

## 2025-08-12 DIAGNOSIS — Z87.59 HISTORY OF CHOLESTASIS DURING PREGNANCY: ICD-10-CM

## 2025-08-12 DIAGNOSIS — Z87.19 HISTORY OF CHOLESTASIS DURING PREGNANCY: ICD-10-CM

## 2025-08-12 DIAGNOSIS — Z3A.20 20 WEEKS GESTATION OF PREGNANCY: ICD-10-CM

## 2025-08-12 DIAGNOSIS — O09.899 RUBELLA NON-IMMUNE STATUS, ANTEPARTUM: ICD-10-CM

## 2025-08-12 PROBLEM — Z34.90 PREGNANCY: Status: ACTIVE | Noted: 2025-08-12

## 2025-08-12 PROBLEM — M76.891 TENDINITIS OF RIGHT QUADRICEPS TENDON: Status: RESOLVED | Noted: 2025-07-18 | Resolved: 2025-08-12

## 2025-08-12 LAB
BILIRUB BLD-MCNC: NEGATIVE MG/DL
CLARITY, POC: CLEAR
COLOR UR: YELLOW
GLUCOSE UR STRIP-MCNC: NEGATIVE MG/DL
KETONES UR QL: NEGATIVE
LEUKOCYTE EST, POC: NEGATIVE
NITRITE UR-MCNC: NEGATIVE MG/ML
PH UR: 5 [PH] (ref 5–8)
PROT UR STRIP-MCNC: NEGATIVE MG/DL
RBC # UR STRIP: NEGATIVE /UL
SP GR UR: 1 (ref 1–1.03)
UROBILINOGEN UR QL: NORMAL

## 2025-08-29 ENCOUNTER — TELEPHONE (OUTPATIENT)
Dept: OBSTETRICS AND GYNECOLOGY | Facility: CLINIC | Age: 25
End: 2025-08-29
Payer: COMMERCIAL

## 2025-08-29 ENCOUNTER — HOSPITAL ENCOUNTER (OUTPATIENT)
Facility: HOSPITAL | Age: 25
Discharge: HOME | End: 2025-08-29
Attending: OBSTETRICS & GYNECOLOGY | Admitting: OBSTETRICS & GYNECOLOGY
Payer: COMMERCIAL

## 2025-08-29 VITALS
WEIGHT: 178 LBS | HEART RATE: 90 BPM | BODY MASS INDEX: 27.94 KG/M2 | RESPIRATION RATE: 18 BRPM | SYSTOLIC BLOOD PRESSURE: 111 MMHG | DIASTOLIC BLOOD PRESSURE: 71 MMHG | TEMPERATURE: 98.5 F | HEIGHT: 67 IN

## 2025-08-29 LAB
BACTERIA UR QL AUTO: ABNORMAL /HPF
BILIRUB UR QL STRIP: NEGATIVE
CLARITY UR: CLEAR
COLOR UR: ABNORMAL
GLUCOSE UR STRIP-MCNC: NEGATIVE MG/DL
HGB UR QL STRIP.AUTO: NEGATIVE
HYALINE CASTS UR QL AUTO: ABNORMAL /LPF
KETONES UR QL STRIP: NEGATIVE
LEUKOCYTE ESTERASE UR QL STRIP.AUTO: ABNORMAL
NITRITE UR QL STRIP: NEGATIVE
PH UR STRIP.AUTO: 7.5 [PH] (ref 4.5–8)
PROT UR QL STRIP: NEGATIVE
RBC # UR STRIP: ABNORMAL /HPF
REF LAB TEST METHOD: ABNORMAL
SP GR UR STRIP: 1.01 (ref 1–1.03)
SQUAMOUS #/AREA URNS HPF: ABNORMAL /HPF
UROBILINOGEN UR QL STRIP: ABNORMAL
WBC # UR STRIP: ABNORMAL /HPF

## 2025-08-29 PROCEDURE — 81001 URINALYSIS AUTO W/SCOPE: CPT | Performed by: OBSTETRICS & GYNECOLOGY
